# Patient Record
Sex: FEMALE | Race: WHITE | URBAN - METROPOLITAN AREA
[De-identification: names, ages, dates, MRNs, and addresses within clinical notes are randomized per-mention and may not be internally consistent; named-entity substitution may affect disease eponyms.]

---

## 2017-02-06 ENCOUNTER — TELEPHONE (OUTPATIENT)
Dept: FAMILY MEDICINE CLINIC | Facility: CLINIC | Age: 37
End: 2017-02-06

## 2017-02-06 NOTE — TELEPHONE ENCOUNTER
Patient states [employer] is to have sent the [place] for  Dr Chow to accept back as Patient. Informed Paperwork has not yet been received. Informed Patient that Appt for Friday 1:30 has been cancelled.   Vinny Zimmerman, 02/06/2017, 12:55

## 2017-02-07 ENCOUNTER — TELEPHONE (OUTPATIENT)
Dept: FAMILY MEDICINE CLINIC | Facility: CLINIC | Age: 37
End: 2017-02-07

## 2017-02-07 NOTE — TELEPHONE ENCOUNTER
----- Message from Nitch sent at 2/7/2017  2:17 PM CST -----  Contact: patient  Returned call and can be reached back on her cell [phone].  Thank you

## 2017-02-08 ENCOUNTER — TELEPHONE (OUTPATIENT)
Dept: FAMILY MEDICINE CLINIC | Facility: CLINIC | Age: 37
End: 2017-02-08

## 2017-02-08 ENCOUNTER — OFFICE VISIT (OUTPATIENT)
Dept: FAMILY MEDICINE CLINIC | Facility: CLINIC | Age: 37
End: 2017-02-08

## 2017-02-08 ENCOUNTER — LAB ENCOUNTER (OUTPATIENT)
Dept: LAB | Age: 37
End: 2017-02-08
Attending: FAMILY MEDICINE
Payer: MEDICAID

## 2017-02-08 VITALS
DIASTOLIC BLOOD PRESSURE: 84 MMHG | HEIGHT: 61.5 IN | RESPIRATION RATE: 16 BRPM | TEMPERATURE: 99 F | BODY MASS INDEX: 27.28 KG/M2 | HEART RATE: 112 BPM | WEIGHT: 146.38 LBS | SYSTOLIC BLOOD PRESSURE: 120 MMHG

## 2017-02-08 DIAGNOSIS — C50.912 BILATERAL MALIGNANT NEOPLASM OF BREAST IN FEMALE, UNSPECIFIED SITE OF BREAST: ICD-10-CM

## 2017-02-08 DIAGNOSIS — N92.0 EXCESSIVE OR FREQUENT MENSTRUATION: ICD-10-CM

## 2017-02-08 DIAGNOSIS — D68.0 VON WILLEBRAND'S DISEASE (HCC): ICD-10-CM

## 2017-02-08 DIAGNOSIS — N92.6 IRREGULAR MENSTRUAL CYCLE: ICD-10-CM

## 2017-02-08 DIAGNOSIS — M12.9 ARTHROPATHY: ICD-10-CM

## 2017-02-08 DIAGNOSIS — Z90.13 S/P BILATERAL MASTECTOMY: ICD-10-CM

## 2017-02-08 DIAGNOSIS — F41.1 ANXIETY STATE: ICD-10-CM

## 2017-02-08 DIAGNOSIS — Z01.818 PREOPERATIVE EXAMINATION: ICD-10-CM

## 2017-02-08 DIAGNOSIS — N83.209 CYST OF OVARY, UNSPECIFIED LATERALITY: ICD-10-CM

## 2017-02-08 DIAGNOSIS — C50.911 BILATERAL MALIGNANT NEOPLASM OF BREAST IN FEMALE, UNSPECIFIED SITE OF BREAST: ICD-10-CM

## 2017-02-08 DIAGNOSIS — G89.4 CHRONIC PAIN SYNDROME: ICD-10-CM

## 2017-02-08 DIAGNOSIS — Z01.818 PREOPERATIVE EXAMINATION: Primary | ICD-10-CM

## 2017-02-08 PROBLEM — Z98.818 S/P WISDOM TOOTH EXTRACTION: Status: ACTIVE | Noted: 2017-02-08

## 2017-02-08 PROBLEM — D68.00 VON WILLEBRAND'S DISEASE (HCC): Status: ACTIVE | Noted: 2017-02-08

## 2017-02-08 PROBLEM — D68.00 VON WILLEBRAND'S DISEASE: Status: ACTIVE | Noted: 2017-02-08

## 2017-02-08 PROBLEM — Z15.01 GENETIC SUSCEPTIBILITY TO BREAST CANCER: Status: ACTIVE | Noted: 2017-02-08

## 2017-02-08 LAB
ALBUMIN SERPL-MCNC: 4.1 G/DL (ref 3.5–4.8)
ALP LIVER SERPL-CCNC: 74 U/L (ref 37–98)
ALT SERPL-CCNC: 26 U/L (ref 14–54)
AST SERPL-CCNC: 15 U/L (ref 15–41)
BASOPHILS # BLD AUTO: 0.07 X10(3) UL (ref 0–0.1)
BASOPHILS NFR BLD AUTO: 0.7 %
BILIRUB SERPL-MCNC: 0.5 MG/DL (ref 0.1–2)
BUN BLD-MCNC: 10 MG/DL (ref 8–20)
CALCIUM BLD-MCNC: 8.8 MG/DL (ref 8.3–10.3)
CHLORIDE: 99 MMOL/L (ref 101–111)
CO2: 28 MMOL/L (ref 22–32)
CREAT BLD-MCNC: 0.85 MG/DL (ref 0.55–1.02)
EOSINOPHIL # BLD AUTO: 0.17 X10(3) UL (ref 0–0.3)
EOSINOPHIL NFR BLD AUTO: 1.7 %
ERYTHROCYTE [DISTWIDTH] IN BLOOD BY AUTOMATED COUNT: 13.4 % (ref 11.5–16)
GLUCOSE BLD-MCNC: 97 MG/DL (ref 70–99)
HCT VFR BLD AUTO: 39.5 % (ref 34–50)
HGB BLD-MCNC: 14.1 G/DL (ref 12–16)
IMMATURE GRANULOCYTE COUNT: 0.03 X10(3) UL (ref 0–1)
IMMATURE GRANULOCYTE RATIO %: 0.3 %
IRON SATURATION: 13 % (ref 13–45)
IRON: 72 UG/DL (ref 28–170)
LYMPHOCYTES # BLD AUTO: 3.75 X10(3) UL (ref 0.9–4)
LYMPHOCYTES NFR BLD AUTO: 38 %
M PROTEIN MFR SERPL ELPH: 8 G/DL (ref 6.1–8.3)
MCH RBC QN AUTO: 31.6 PG (ref 27–33.2)
MCHC RBC AUTO-ENTMCNC: 35.7 G/DL (ref 31–37)
MCV RBC AUTO: 88.6 FL (ref 81–100)
MONOCYTES # BLD AUTO: 0.75 X10(3) UL (ref 0.1–0.6)
MONOCYTES NFR BLD AUTO: 7.6 %
NEUTROPHIL ABS PRELIM: 5.09 X10 (3) UL (ref 1.3–6.7)
NEUTROPHILS # BLD AUTO: 5.09 X10(3) UL (ref 1.3–6.7)
NEUTROPHILS NFR BLD AUTO: 51.7 %
PLATELET # BLD AUTO: 389 10(3)UL (ref 150–450)
POTASSIUM SERPL-SCNC: 3 MMOL/L (ref 3.6–5.1)
RBC # BLD AUTO: 4.46 X10(6)UL (ref 3.8–5.1)
RED CELL DISTRIBUTION WIDTH-SD: 43.5 FL (ref 35.1–46.3)
SODIUM SERPL-SCNC: 134 MMOL/L (ref 136–144)
TOTAL IRON BINDING CAPACITY: 562 UG/DL (ref 298–536)
TRANSFERRIN: 377 MG/DL (ref 200–360)
WBC # BLD AUTO: 9.9 X10(3) UL (ref 4–13)

## 2017-02-08 PROCEDURE — 83540 ASSAY OF IRON: CPT

## 2017-02-08 PROCEDURE — 85025 COMPLETE CBC W/AUTO DIFF WBC: CPT

## 2017-02-08 PROCEDURE — 99215 OFFICE O/P EST HI 40 MIN: CPT | Performed by: FAMILY MEDICINE

## 2017-02-08 PROCEDURE — 83550 IRON BINDING TEST: CPT

## 2017-02-08 PROCEDURE — 80053 COMPREHEN METABOLIC PANEL: CPT

## 2017-02-08 RX ORDER — FERROUS SULFATE 325(65) MG
1 TABLET ORAL DAILY
COMMUNITY
Start: 2016-11-16 | End: 2017-06-27

## 2017-02-08 RX ORDER — FUROSEMIDE 20 MG/1
1 TABLET ORAL DAILY
COMMUNITY
Start: 2013-09-17 | End: 2017-03-08

## 2017-02-08 RX ORDER — PANTOPRAZOLE SODIUM 40 MG/1
1 TABLET, DELAYED RELEASE ORAL DAILY
COMMUNITY
Start: 2014-10-10 | End: 2017-06-27 | Stop reason: ALTCHOICE

## 2017-02-08 RX ORDER — CETIRIZINE HYDROCHLORIDE 10 MG/1
1 TABLET ORAL DAILY
COMMUNITY
Start: 2016-10-11

## 2017-02-08 RX ORDER — HYDROCODONE BITARTRATE AND ACETAMINOPHEN 5; 325 MG/1; MG/1
1 TABLET ORAL DAILY
COMMUNITY
Start: 2016-01-13 | End: 2017-04-19

## 2017-02-08 NOTE — TELEPHONE ENCOUNTER
Patient states She call IPA Today. They informed Her IPA Effective with KMA Tomorrow. Per Donna POSADA--Dr Cristie Cranker is PCP as of Today. Patient will keep Her Appt.   Nasrin Shah, 02/08/2017, 11:39 AM

## 2017-02-08 NOTE — PATIENT INSTRUCTIONS
Medically clear for surgery. Discuss hormone treatments for surgical menopause with Dr Rommel Castañeda.

## 2017-02-09 ENCOUNTER — TELEPHONE (OUTPATIENT)
Dept: FAMILY MEDICINE CLINIC | Facility: CLINIC | Age: 37
End: 2017-02-09

## 2017-02-09 DIAGNOSIS — E87.6 HYPOKALEMIA: Primary | ICD-10-CM

## 2017-02-09 RX ORDER — POTASSIUM CHLORIDE 750 MG/1
10 TABLET, FILM COATED, EXTENDED RELEASE ORAL DAILY
Qty: 7 TABLET | Refills: 0 | Status: SHIPPED | OUTPATIENT
Start: 2017-02-09 | End: 2017-02-10

## 2017-02-09 NOTE — H&P
[facility]  PRE-[facility]    HPI:   Paul Pavon is a 31year old female with a hx of menorrhagia and high risk HPV, who presents for a pre-operative physical exam. Patient is to have total hysterectomy and bilateral salpingo-oophorecto Use: No    Sexual Activity: Yes    Partners: Male     Other Topics Concern   None on file     Social History Narrative   None on file       REVIEW OF SYSTEMS:   CONSTITUTIONAL:  Denies unusual weight gain/loss, fever, chills, or fatigue.   EENT:  Eyes:  Andrés Kayla alert, awake and oriented, well developed, well nourished, no apparent distress.   HEENT:  Head:  Normocephalic, atraumatic Eyes: EOMI, PERRLA, no scleral icterus, conjunctivae clear bilaterally, no eye discharge Ears: TM's clear and intact bilaterally, no

## 2017-02-09 NOTE — TELEPHONE ENCOUNTER
Patient informed of K level. Patient states she has been feeling fatigued, \"coming down with something\". Has been having muscle pain but patient states she always has muscle pain due to chemotherapy. Last dose of fursomide was yesterday.    Patient is

## 2017-02-09 NOTE — TELEPHONE ENCOUNTER
Klor-con 10 mEq po daily x 7 days, recheck potassium level in 1 week. Orders placed. Follow up with Dr. Bonita Zapata one week. Plz notify pt.

## 2017-02-09 NOTE — TELEPHONE ENCOUNTER
Oneyda from [address] [place] [address] is wanting the CBC results for the pre op   Surgery is 2/16  Faxing over results Chris Kumar, 02/09/2017, 9:51 AM

## 2017-02-09 NOTE — TELEPHONE ENCOUNTER
Pt states that she is having surgery done on Thursday but is getting labs done at [facility] on Tuesday wondering if she could just get labs done on Tuesday instead? Pt verbalized her understanding to the rest of the message.      Irene can patient get labs done

## 2017-02-10 ENCOUNTER — TELEPHONE (OUTPATIENT)
Dept: FAMILY MEDICINE CLINIC | Facility: CLINIC | Age: 37
End: 2017-02-10

## 2017-02-10 RX ORDER — POTASSIUM CHLORIDE 750 MG/1
10 TABLET, FILM COATED, EXTENDED RELEASE ORAL DAILY
Qty: 30 TABLET | Refills: 5 | Status: SHIPPED | OUTPATIENT
Start: 2017-02-10 | End: 2017-06-05

## 2017-02-10 NOTE — TELEPHONE ENCOUNTER
----- Message from Karlos Nam MD sent at 2/10/2017  8:30 AM CST -----  Please call Miladys. Her potassium level is low. When she takes her Lasix she will need to take a potassium supplement with it.   I will send in a prescription for her for a pot

## 2017-02-10 NOTE — TELEPHONE ENCOUNTER
Order signed and will send back to [facility] Patient Scheduling.   Vinny Zimmerman, 02/10/2017, 4:34 PM

## 2017-03-08 ENCOUNTER — LAB ENCOUNTER (OUTPATIENT)
Dept: LAB | Age: 37
End: 2017-03-08
Attending: FAMILY MEDICINE
Payer: MEDICAID

## 2017-03-08 ENCOUNTER — OFFICE VISIT (OUTPATIENT)
Dept: FAMILY MEDICINE CLINIC | Facility: CLINIC | Age: 37
End: 2017-03-08

## 2017-03-08 ENCOUNTER — TELEPHONE (OUTPATIENT)
Dept: FAMILY MEDICINE CLINIC | Facility: CLINIC | Age: 37
End: 2017-03-08

## 2017-03-08 VITALS
HEART RATE: 120 BPM | DIASTOLIC BLOOD PRESSURE: 64 MMHG | SYSTOLIC BLOOD PRESSURE: 90 MMHG | BODY MASS INDEX: 27.81 KG/M2 | RESPIRATION RATE: 16 BRPM | HEIGHT: 62 IN | TEMPERATURE: 97 F | WEIGHT: 151.13 LBS

## 2017-03-08 DIAGNOSIS — Z90.710 STATUS POST HYSTERECTOMY: Primary | ICD-10-CM

## 2017-03-08 DIAGNOSIS — E87.6 HYPOKALEMIA: ICD-10-CM

## 2017-03-08 DIAGNOSIS — D68.0 VON WILLEBRAND'S DISEASE (HCC): ICD-10-CM

## 2017-03-08 DIAGNOSIS — Z90.710 STATUS POST HYSTERECTOMY: ICD-10-CM

## 2017-03-08 LAB
ALBUMIN SERPL-MCNC: 3.8 G/DL (ref 3.5–4.8)
ALP LIVER SERPL-CCNC: 73 U/L (ref 37–98)
ALT SERPL-CCNC: 32 U/L (ref 14–54)
AST SERPL-CCNC: 20 U/L (ref 15–41)
BASOPHILS # BLD AUTO: 0.07 X10(3) UL (ref 0–0.1)
BASOPHILS NFR BLD AUTO: 0.8 %
BILIRUB SERPL-MCNC: 0.4 MG/DL (ref 0.1–2)
BUN BLD-MCNC: 9 MG/DL (ref 8–20)
CALCIUM BLD-MCNC: 9.4 MG/DL (ref 8.3–10.3)
CHLORIDE: 104 MMOL/L (ref 101–111)
CO2: 29 MMOL/L (ref 22–32)
CREAT BLD-MCNC: 0.67 MG/DL (ref 0.55–1.02)
EOSINOPHIL # BLD AUTO: 0.21 X10(3) UL (ref 0–0.3)
EOSINOPHIL NFR BLD AUTO: 2.5 %
ERYTHROCYTE [DISTWIDTH] IN BLOOD BY AUTOMATED COUNT: 12.7 % (ref 11.5–16)
GLUCOSE BLD-MCNC: 98 MG/DL (ref 70–99)
HCT VFR BLD AUTO: 38.8 % (ref 34–50)
HGB BLD-MCNC: 13.6 G/DL (ref 12–16)
IMMATURE GRANULOCYTE COUNT: 0.02 X10(3) UL (ref 0–1)
IMMATURE GRANULOCYTE RATIO %: 0.2 %
LYMPHOCYTES # BLD AUTO: 2.78 X10(3) UL (ref 0.9–4)
LYMPHOCYTES NFR BLD AUTO: 33.7 %
M PROTEIN MFR SERPL ELPH: 7.2 G/DL (ref 6.1–8.3)
MCH RBC QN AUTO: 31.1 PG (ref 27–33.2)
MCHC RBC AUTO-ENTMCNC: 35.1 G/DL (ref 31–37)
MCV RBC AUTO: 88.6 FL (ref 81–100)
MONOCYTES # BLD AUTO: 0.77 X10(3) UL (ref 0.1–0.6)
MONOCYTES NFR BLD AUTO: 9.3 %
NEUTROPHIL ABS PRELIM: 4.39 X10 (3) UL (ref 1.3–6.7)
NEUTROPHILS # BLD AUTO: 4.39 X10(3) UL (ref 1.3–6.7)
NEUTROPHILS NFR BLD AUTO: 53.5 %
PLATELET # BLD AUTO: 324 10(3)UL (ref 150–450)
POTASSIUM SERPL-SCNC: 3.4 MMOL/L (ref 3.6–5.1)
RBC # BLD AUTO: 4.38 X10(6)UL (ref 3.8–5.1)
RED CELL DISTRIBUTION WIDTH-SD: 41.4 FL (ref 35.1–46.3)
SODIUM SERPL-SCNC: 140 MMOL/L (ref 136–144)
WBC # BLD AUTO: 8.2 X10(3) UL (ref 4–13)

## 2017-03-08 PROCEDURE — 85025 COMPLETE CBC W/AUTO DIFF WBC: CPT

## 2017-03-08 PROCEDURE — 80053 COMPREHEN METABOLIC PANEL: CPT

## 2017-03-08 PROCEDURE — 99214 OFFICE O/P EST MOD 30 MIN: CPT | Performed by: FAMILY MEDICINE

## 2017-03-08 RX ORDER — IBUPROFEN 600 MG/1
1 TABLET ORAL AS DIRECTED
Refills: 1 | COMMUNITY
Start: 2017-03-01 | End: 2017-04-19

## 2017-03-08 RX ORDER — FUROSEMIDE 20 MG/1
20 TABLET ORAL DAILY
Qty: 30 TABLET | Refills: 5 | Status: SHIPPED | OUTPATIENT
Start: 2017-03-08 | End: 2017-04-19

## 2017-03-08 RX ORDER — HYDROCODONE BITARTRATE AND ACETAMINOPHEN 7.5; 325 MG/1; MG/1
1 TABLET ORAL AS DIRECTED
Refills: 0 | COMMUNITY
Start: 2017-03-01 | End: 2017-04-19

## 2017-03-08 NOTE — PROGRESS NOTES
[facility] [place]  PROGRESS NOTE  Chief Complaint:   Patient presents with:  Post-Op: ROSA ELENA      HPI:   This is a 31year old female coming in for follow-up after a hysterectomy.   She had a laparoscopic vaginal hysterectomy at [facility] 38.0 %   Monocyte % 7.6 %   Eosinophil % 1.7 %   Basophil % 0.7 %   Immature Granulocyte % 0.3 %       Past Medical History   Diagnosis Date   • Allergic rhinitis    • Cancer [facility])    • Esophageal reflux    • [facility] 2/8/2017   • Mal Rfl: 5   Potassium Chloride ER (KLOR-CON 10) 10 MEQ Oral Tab CR Take 1 tablet (10 mEq total) by mouth daily. Disp: 30 tablet Rfl: 5   cetirizine (ZYRTEC ALLERGY) 10 MG Oral Tab Take 1 tablet by mouth daily.  Disp:  Rfl:    Ferrous Sulfate 325 (65 Fe) MG Ora 12/07/2016 (Approximate) Estimated body mass index is 27.63 kg/(m^2) as calculated from the following:    Height as of this encounter: 62\". Weight as of this encounter: 151 lb 2 oz. Vital signs reviewed.   GEN:  Patient is alert, awake and oriented, w by mouth daily. Patient/Caregiver Education: Patient/Caregiver Education: There are no barriers to learning. Medical education done. Outcome: Patient verbalizes understanding.  Patient is notified to call with any questions, complications, all

## 2017-03-09 NOTE — TELEPHONE ENCOUNTER
----- Message from Nury Simental MD sent at 3/8/2017  5:32 PM CST -----  Please call Miladys. Her potassium level is still low. I do want her to take the potassium that we have prescribed for her before.   Her blood count is normal.  She is not anemic

## 2017-03-10 NOTE — TELEPHONE ENCOUNTER
Patient informed of Dr Blanca Perez documentation below. Expressed understanding.   Elijah Green, 03/10/2017, 1:10 PM

## 2017-03-10 NOTE — TELEPHONE ENCOUNTER
----- Message from Lupe Brand sent at 3/10/2017 10:00 AM CST -----  Contact: patient   Returned your call from Wednesday and can be reached back at number above.  Thank you

## 2017-04-03 ENCOUNTER — TELEPHONE (OUTPATIENT)
Dept: FAMILY MEDICINE CLINIC | Facility: CLINIC | Age: 37
End: 2017-04-03

## 2017-04-03 RX ORDER — NITROFURANTOIN 25; 75 MG/1; MG/1
100 CAPSULE ORAL DAILY
Refills: 0 | COMMUNITY
Start: 2017-03-27 | End: 2017-04-19 | Stop reason: ALTCHOICE

## 2017-04-03 NOTE — TELEPHONE ENCOUNTER
AGUILARI---Patient states She was started on Progesterone 100mg. Allergic to Rx. Hives/Itching. States will be starting low dose BCP for Menopausal Sx. Wanting Dr Thomas Plant aware.   Kristal Berry, 04/03/2017, 9:23 AM

## 2017-04-19 ENCOUNTER — OFFICE VISIT (OUTPATIENT)
Dept: FAMILY MEDICINE CLINIC | Facility: CLINIC | Age: 37
End: 2017-04-19

## 2017-04-19 ENCOUNTER — TELEPHONE (OUTPATIENT)
Dept: FAMILY MEDICINE CLINIC | Facility: CLINIC | Age: 37
End: 2017-04-19

## 2017-04-19 ENCOUNTER — APPOINTMENT (OUTPATIENT)
Dept: LAB | Age: 37
End: 2017-04-19
Attending: NURSE PRACTITIONER
Payer: MEDICAID

## 2017-04-19 VITALS
TEMPERATURE: 98 F | BODY MASS INDEX: 28 KG/M2 | DIASTOLIC BLOOD PRESSURE: 70 MMHG | WEIGHT: 155.63 LBS | SYSTOLIC BLOOD PRESSURE: 130 MMHG | HEART RATE: 82 BPM

## 2017-04-19 DIAGNOSIS — R60.1 GENERALIZED EDEMA: ICD-10-CM

## 2017-04-19 DIAGNOSIS — R60.1 GENERALIZED EDEMA: Primary | ICD-10-CM

## 2017-04-19 PROCEDURE — 99214 OFFICE O/P EST MOD 30 MIN: CPT | Performed by: NURSE PRACTITIONER

## 2017-04-19 PROCEDURE — 80053 COMPREHEN METABOLIC PANEL: CPT

## 2017-04-19 RX ORDER — FUROSEMIDE 20 MG/1
TABLET ORAL
Qty: 60 TABLET | Refills: 3 | Status: SHIPPED | OUTPATIENT
Start: 2017-04-19 | End: 2017-06-27 | Stop reason: DRUGHIGH

## 2017-04-19 RX ORDER — HYDROCODONE BITARTRATE AND ACETAMINOPHEN 5; 325 MG/1; MG/1
1 TABLET ORAL DAILY
Qty: 30 TABLET | Refills: 0 | Status: SHIPPED | OUTPATIENT
Start: 2017-04-19 | End: 2017-07-17

## 2017-04-19 RX ORDER — IBUPROFEN 600 MG/1
600 TABLET ORAL EVERY 8 HOURS PRN
Qty: 60 TABLET | Refills: 3 | Status: SHIPPED | OUTPATIENT
Start: 2017-04-19 | End: 2017-10-08

## 2017-04-19 RX ORDER — NORETHINDRONE ACETATE AND ETHINYL ESTRADIOL AND FERROUS FUMARATE 1MG-20(21)
KIT ORAL
Refills: 0 | COMMUNITY
Start: 2017-04-03 | End: 2017-06-27 | Stop reason: ALTCHOICE

## 2017-04-19 NOTE — PROGRESS NOTES
Reji Torres is a 31year old female. Patient presents with:  Swelling      HPI:   Had ROSA ELENA BSO- lap - vaginal Feb, 2017. Having swelling to both arms-  Has had more swelling to hands - and some swelling to legs   lasix 20 mg. - not helping.     some nu Pantoprazole Sodium (PROTONIX) 40 MG Oral Tab EC Take 1 tablet by mouth daily. Disp:  Rfl:    HYDROcodone-acetaminophen (NORCO) 5-325 MG Oral Tab Take 1 tablet by mouth daily.  Disp: 30 tablet Rfl: 0   [DISCONTINUED] hydrocodone-acetaminophen 7.5-325 MG O HPI      EXAM:   /70 mmHg  Pulse 82  Temp(Src) 98.2 °F (36.8 °C) (Tympanic)  Wt 155 lb 9.6 oz  LMP 12/07/2016 (Approximate)  GENERAL: well developed, well nourished,in no apparent distress  SKIN: no rashes,no suspicious lesions  HEENT: atraumatic, no

## 2017-04-19 NOTE — TELEPHONE ENCOUNTER
Patient states She is having increased hand/feet swelling. Would like evaluation before She leaves out of town. Appt given with Marcos VASQUEZ 2:15 Today.   Juaquin Calloway, 04/19/2017, 10:53 AM

## 2017-04-19 NOTE — PATIENT INSTRUCTIONS
Take 2, 20 mg Lasix tablets once a day for the next 3 days, then you may decrease to 1 pill once a day. We will check blood work today to check potassium and sodium levels, results are back tomorrow we will call you.

## 2017-04-20 ENCOUNTER — TELEPHONE (OUTPATIENT)
Dept: FAMILY MEDICINE CLINIC | Facility: CLINIC | Age: 37
End: 2017-04-20

## 2017-04-20 RX ORDER — IBUPROFEN 600 MG/1
TABLET ORAL
Qty: 270 TABLET | Refills: 3 | OUTPATIENT
Start: 2017-04-20

## 2017-05-08 ENCOUNTER — OFFICE VISIT (OUTPATIENT)
Dept: FAMILY MEDICINE CLINIC | Facility: CLINIC | Age: 37
End: 2017-05-08

## 2017-05-08 VITALS
DIASTOLIC BLOOD PRESSURE: 64 MMHG | RESPIRATION RATE: 16 BRPM | BODY MASS INDEX: 29.04 KG/M2 | HEIGHT: 61.25 IN | OXYGEN SATURATION: 99 % | TEMPERATURE: 98 F | HEART RATE: 114 BPM | WEIGHT: 155.81 LBS | SYSTOLIC BLOOD PRESSURE: 124 MMHG

## 2017-05-08 DIAGNOSIS — Z15.02 GENETIC SUSCEPTIBILITY TO OVARIAN CANCER: ICD-10-CM

## 2017-05-08 DIAGNOSIS — R60.1 GENERALIZED EDEMA: ICD-10-CM

## 2017-05-08 DIAGNOSIS — Z90.710 STATUS POST HYSTERECTOMY: ICD-10-CM

## 2017-05-08 DIAGNOSIS — Z90.13 S/P BILATERAL MASTECTOMY: ICD-10-CM

## 2017-05-08 DIAGNOSIS — F41.1 ANXIETY STATE: ICD-10-CM

## 2017-05-08 DIAGNOSIS — G89.4 CHRONIC PAIN SYNDROME: ICD-10-CM

## 2017-05-08 DIAGNOSIS — M12.9 ARTHROPATHY: Primary | ICD-10-CM

## 2017-05-08 DIAGNOSIS — E87.6 HYPOKALEMIA: ICD-10-CM

## 2017-05-08 DIAGNOSIS — D68.0 VON WILLEBRAND'S DISEASE (HCC): ICD-10-CM

## 2017-05-08 DIAGNOSIS — Z15.01 GENETIC SUSCEPTIBILITY TO BREAST CANCER: ICD-10-CM

## 2017-05-08 PROCEDURE — 99214 OFFICE O/P EST MOD 30 MIN: CPT | Performed by: FAMILY MEDICINE

## 2017-05-08 RX ORDER — HYDROCODONE BITARTRATE AND ACETAMINOPHEN 7.5; 325 MG/1; MG/1
1 TABLET ORAL DAILY PRN
Qty: 30 TABLET | Refills: 0 | Status: SHIPPED | OUTPATIENT
Start: 2017-05-08 | End: 2017-06-05

## 2017-05-09 NOTE — PROGRESS NOTES
[facility] [place]  PROGRESS NOTE  Chief Complaint:   Patient presents with:  Medication Follow-Up      HPI:   This is a 31year old female coming in for follow-up. She had her hysterectomy in February.   Since that time she has been following PANEL (14)   Result Value Ref Range   Glucose 86 70-99 mg/dL   BUN 8 8-20 mg/dL   Creatinine 0.78 0.55-1.02 mg/dL   GFR 99 >=60   Calcium, Total 9.1 8.3-10.3 mg/dL   Alkaline Phosphatase 74 37-98 U/L   AST 14 (L) 15-41 U/L   Alt 25 14-54 U/L   Bilirubin, T Palpitations  Current Meds:    Current Outpatient Prescriptions:  hydrocodone-acetaminophen (NORCO) 7.5-325 MG Oral Tab Take 1 tablet by mouth daily as needed for Pain.  Disp: 30 tablet Rfl: 0   MICROGESTIN FE 1/20 1-20 MG-MCG Oral Tab TK 1 T PO QD pain, swelling or stiffness. NEUROLOGICAL:  Denies headache, seizures, dizziness, syncope, paralysis, ataxia, numbness or tingling in the extremities,change in bowel or bladder control. HEMATOLOGIC: She has not had any recent bleeding issues.   LYMPHATICS favors her right hip. Her legs are somewhat stiff when she walks. ASSESSMENT AND PLAN:   1. Arthropathy  She has diffuse arthropathy which is a major part of her chronic pain syndrome. This arthropathy has not improved recently.     2. Ronaldo Welch allergies, or worsening or changing symptoms. Patient is to call with any side effects or complications from the treatments as a result of today.      Problem List:  Patient Active Problem List:     Anxiety state     Arthropathy     Vaginal high risk human

## 2017-06-05 ENCOUNTER — OFFICE VISIT (OUTPATIENT)
Dept: FAMILY MEDICINE CLINIC | Facility: CLINIC | Age: 37
End: 2017-06-05

## 2017-06-05 ENCOUNTER — LAB ENCOUNTER (OUTPATIENT)
Dept: LAB | Age: 37
End: 2017-06-05
Attending: FAMILY MEDICINE
Payer: MEDICAID

## 2017-06-05 VITALS
SYSTOLIC BLOOD PRESSURE: 104 MMHG | RESPIRATION RATE: 18 BRPM | HEIGHT: 61.5 IN | BODY MASS INDEX: 28.59 KG/M2 | DIASTOLIC BLOOD PRESSURE: 70 MMHG | TEMPERATURE: 98 F | HEART RATE: 114 BPM | WEIGHT: 153.38 LBS

## 2017-06-05 DIAGNOSIS — D68.0 VON WILLEBRAND'S DISEASE (HCC): ICD-10-CM

## 2017-06-05 DIAGNOSIS — M12.9 ARTHROPATHY: ICD-10-CM

## 2017-06-05 DIAGNOSIS — E87.6 HYPOKALEMIA: ICD-10-CM

## 2017-06-05 DIAGNOSIS — R60.1 GENERALIZED EDEMA: ICD-10-CM

## 2017-06-05 DIAGNOSIS — E87.6 HYPOKALEMIA: Primary | ICD-10-CM

## 2017-06-05 DIAGNOSIS — N95.1 MENOPAUSAL SYNDROME: ICD-10-CM

## 2017-06-05 DIAGNOSIS — G89.4 CHRONIC PAIN SYNDROME: ICD-10-CM

## 2017-06-05 PROCEDURE — 85025 COMPLETE CBC W/AUTO DIFF WBC: CPT

## 2017-06-05 PROCEDURE — 80053 COMPREHEN METABOLIC PANEL: CPT

## 2017-06-05 PROCEDURE — 36415 COLL VENOUS BLD VENIPUNCTURE: CPT

## 2017-06-05 PROCEDURE — 99213 OFFICE O/P EST LOW 20 MIN: CPT | Performed by: FAMILY MEDICINE

## 2017-06-05 RX ORDER — HYDROCODONE BITARTRATE AND ACETAMINOPHEN 7.5; 325 MG/1; MG/1
1 TABLET ORAL DAILY PRN
Qty: 30 TABLET | Refills: 0 | Status: SHIPPED | OUTPATIENT
Start: 2017-06-05 | End: 2017-06-20

## 2017-06-05 RX ORDER — POTASSIUM CHLORIDE 750 MG/1
10 TABLET, FILM COATED, EXTENDED RELEASE ORAL 2 TIMES DAILY
Qty: 180 TABLET | Refills: 3 | Status: SHIPPED | OUTPATIENT
Start: 2017-06-05 | End: 2018-04-09

## 2017-06-05 RX ORDER — FUROSEMIDE 40 MG/1
40 TABLET ORAL DAILY
Qty: 90 TABLET | Refills: 3 | Status: SHIPPED | OUTPATIENT
Start: 2017-06-05 | End: 2018-02-25

## 2017-06-05 RX ORDER — ESTRADIOL 0.5 MG/1
0.5 TABLET ORAL DAILY
COMMUNITY
Start: 2017-06-03 | End: 2017-09-26

## 2017-06-05 NOTE — PROGRESS NOTES
[address]  PROGRESS NOTE  Chief Complaint:   Patient presents with: Follow - Up: med check      HPI:   This is a 31year old female coming in for follow-up on her medications. She said she is gradually feeling better.   She still has s HYSTERECTOMY  Feb , 2016    Comment ROSA ELENA, BSO, lap/vaginal -      Social History:    Smoking Status: Former Smoker                   Packs/Day: 0.25  Years:           Types: Cigarettes      Start date: 01/01/1999      Quit date: 01/01/2009    Smokeless Stat (ZYRTEC ALLERGY) 10 MG Oral Tab Take 1 tablet by mouth daily. Disp:  Rfl:    Ferrous Sulfate 325 (65 Fe) MG Oral Tab Take 1 tablet by mouth daily. Disp:  Rfl:    Pantoprazole Sodium (PROTONIX) 40 MG Oral Tab EC Take 1 tablet by mouth daily.  Disp:  Rfl: rhinitis.      EXAM:   /70 mmHg  Pulse 114  Temp(Src) 97.9 °F (36.6 °C) (Temporal)  Resp 18  Ht 61.5\"  Wt 153 lb 6.4 oz  BMI 28.52 kg/m2  LMP 12/07/2016 (Approximate) Estimated body mass index is 28.52 kg/(m^2) as calculated from the following:    He syndrome. We will continue the current regimen of Norco 7.5 mg daily. 6. Generalized edema  Generalized edema seems fairly well-controlled on the current dose of furosemide.   Plan: She may take 40 mg, 20 mg, or 0 furosemide daily depending on the degre S/P wisdom tooth extraction     Hypokalemia     Status post hysterectomy     Female infertility     Primary malignant neoplasm of breast ([facility]     Type 1 von Willebrand disease ([facility]     Menopausal syndrome      Vee Scott MD  6/5/2017  3:50 PM

## 2017-06-06 ENCOUNTER — TELEPHONE (OUTPATIENT)
Dept: FAMILY MEDICINE CLINIC | Facility: CLINIC | Age: 37
End: 2017-06-06

## 2017-06-06 NOTE — TELEPHONE ENCOUNTER
----- Message from Kat Solares MD sent at 6/6/2017  7:53 AM CDT -----  Please call Miladys. Her potassium is still on the low side at 3.5. Make sure to take the potassium as directed.   The rest of the labs are normal.

## 2017-06-20 ENCOUNTER — TELEPHONE (OUTPATIENT)
Dept: FAMILY MEDICINE CLINIC | Facility: CLINIC | Age: 37
End: 2017-06-20

## 2017-06-20 RX ORDER — HYDROCODONE BITARTRATE AND ACETAMINOPHEN 7.5; 325 MG/1; MG/1
1 TABLET ORAL DAILY PRN
Qty: 30 TABLET | Refills: 0 | Status: SHIPPED | OUTPATIENT
Start: 2017-06-20 | End: 2017-06-27

## 2017-06-20 NOTE — TELEPHONE ENCOUNTER
Please advise another Rx of Norco.  Please advise if Ok to take Naproxyn with other Medications.   Indra Terrazas, 06/20/2017, 1:57 PM

## 2017-06-20 NOTE — TELEPHONE ENCOUNTER
I will refill [place].  Naproxen is OK with other medicines as long as she stops taking Ibuprofen.

## 2017-06-26 ENCOUNTER — TELEPHONE (OUTPATIENT)
Dept: FAMILY MEDICINE CLINIC | Facility: CLINIC | Age: 37
End: 2017-06-26

## 2017-06-26 NOTE — TELEPHONE ENCOUNTER
Patient with recent [facility] ER evaluation. States She is still having a lot of chest discomfort. Also states She now has some kind of pocket near Her Ribs. Requesting to see Dr Chandler Galvan only-Offered evaluation with other provider-denied.   Appt given 11:30

## 2017-06-27 ENCOUNTER — OFFICE VISIT (OUTPATIENT)
Dept: FAMILY MEDICINE CLINIC | Facility: CLINIC | Age: 37
End: 2017-06-27

## 2017-06-27 VITALS
BODY MASS INDEX: 29.12 KG/M2 | TEMPERATURE: 98 F | HEART RATE: 100 BPM | WEIGHT: 156.25 LBS | DIASTOLIC BLOOD PRESSURE: 60 MMHG | OXYGEN SATURATION: 96 % | RESPIRATION RATE: 16 BRPM | HEIGHT: 61.5 IN | SYSTOLIC BLOOD PRESSURE: 106 MMHG

## 2017-06-27 DIAGNOSIS — L03.313 CELLULITIS OF CHEST WALL: Primary | ICD-10-CM

## 2017-06-27 DIAGNOSIS — G89.4 CHRONIC PAIN SYNDROME: ICD-10-CM

## 2017-06-27 DIAGNOSIS — Z90.13 S/P BILATERAL MASTECTOMY: ICD-10-CM

## 2017-06-27 PROCEDURE — 99213 OFFICE O/P EST LOW 20 MIN: CPT | Performed by: FAMILY MEDICINE

## 2017-06-27 RX ORDER — HYDROCODONE BITARTRATE AND ACETAMINOPHEN 10; 325 MG/1; MG/1
1 TABLET ORAL EVERY 6 HOURS PRN
Qty: 20 TABLET | Refills: 0 | Status: SHIPPED | OUTPATIENT
Start: 2017-06-27 | End: 2017-08-21 | Stop reason: DRUGHIGH

## 2017-06-27 RX ORDER — FERROUS SULFATE 325(65) MG
1 TABLET ORAL DAILY
Qty: 90 TABLET | Refills: 3 | Status: SHIPPED | OUTPATIENT
Start: 2017-06-27 | End: 2018-02-07

## 2017-06-27 RX ORDER — CEPHALEXIN 500 MG/1
500 CAPSULE ORAL 3 TIMES DAILY
Qty: 21 CAPSULE | Refills: 0 | Status: SHIPPED | OUTPATIENT
Start: 2017-06-27 | End: 2017-08-21 | Stop reason: ALTCHOICE

## 2017-06-27 RX ORDER — ESOMEPRAZOLE MAGNESIUM 40 MG/1
40 CAPSULE, DELAYED RELEASE ORAL
COMMUNITY
End: 2018-11-30

## 2017-06-28 NOTE — PROGRESS NOTES
[facility] [place]  PROGRESS NOTE  Chief Complaint:   Patient presents with:  Hospital F/U: Post [facility] ER  Swelling: Left Rib Cage/Flank      HPI:   This is a 31year old female coming in for follow-up from an emergency room visit.   She said that Eosinophil Absolute 0.16 0.00 - 0.30 x10(3) uL   Basophil Absolute 0.06 0.00 - 0.10 x10(3) uL   Immature Granulocyte Absolute 0.02 0.00 - 1.00 x10(3) uL   Neutrophil % 60.6 %   Lymphocyte % 27.7 %   Monocyte % 9.2 %   Eosinophil % 1.7 %   Basophil % 0.6 Capsule Delayed Release Take 40 mg by mouth every morning before breakfast. Disp:  Rfl:    cephALEXin 500 MG Oral Cap Take 1 capsule (500 mg total) by mouth 3 (three) times daily.  Disp: 21 capsule Rfl: 0   HYDROcodone-acetaminophen (NORCO)  MG Oral T wheezing, cough or sputum. GASTROINTESTINAL:  Denies abdominal pain, nausea, vomiting, constipation, diarrhea, or blood in stool. MUSCULOSKELETAL:  Denies weakness, muscle aches, back pain, joint pain, swelling or stiffness.   NEUROLOGICAL:  Denies headac with it. It follows roughly along the seventh rib. Breast: Her breast implants are intact bilaterally. There is no pain or tenderness associated with either implant. There is no evidence of deformity or malformation of the implants.   ABDOMEN:  Soft, no understanding. Patient is notified to call with any questions, complications, allergies, or worsening or changing symptoms. Patient is to call with any side effects or complications from the treatments as a result of today.      Problem List:  Patient Acti

## 2017-07-05 ENCOUNTER — TELEPHONE (OUTPATIENT)
Dept: FAMILY MEDICINE CLINIC | Facility: CLINIC | Age: 37
End: 2017-07-05

## 2017-07-05 DIAGNOSIS — Q67.8 CHEST WALL ASYMMETRY: Primary | ICD-10-CM

## 2017-07-05 NOTE — TELEPHONE ENCOUNTER
Next step is a CT of the chest and a followup appointment. I will send in an order for the CT. I will be out of the office the next 2 weeks so I advise a follow-up with one of the other physicians in the office.

## 2017-07-06 ENCOUNTER — TELEPHONE (OUTPATIENT)
Dept: FAMILY MEDICINE CLINIC | Facility: CLINIC | Age: 37
End: 2017-07-06

## 2017-07-06 DIAGNOSIS — C50.919 PRIMARY MALIGNANT NEOPLASM OF BREAST, UNSPECIFIED LATERALITY (HCC): ICD-10-CM

## 2017-07-06 DIAGNOSIS — L03.313 CELLULITIS OF CHEST WALL: Primary | ICD-10-CM

## 2017-07-06 NOTE — TELEPHONE ENCOUNTER
[facility] Patient Scheduling is calling asking for a new order. Dr Beni Zamudio ordered a CT of Chest of W/ & W/O, but can not be done with both. Need new order for W/ or W/O. Camilla Harley can you please advise. Thank you.

## 2017-07-07 ENCOUNTER — TELEPHONE (OUTPATIENT)
Dept: FAMILY MEDICINE CLINIC | Facility: CLINIC | Age: 37
End: 2017-07-07

## 2017-07-07 NOTE — TELEPHONE ENCOUNTER
Patient informed of below. She will call for CT Scan Appt and follow up visit with provider. Will call back when She has a CT Scan date.   Cristobal Alatorre, 07/07/17, 1:21 PM

## 2017-07-10 NOTE — TELEPHONE ENCOUNTER
Patient has CT Scan Scheduled for Thursday 7/13. Appt given to review results with Dr Lane Lou Monday 7/17.   Tegan Mendez, 07/10/17, 5:34 PM

## 2017-07-14 ENCOUNTER — TELEPHONE (OUTPATIENT)
Dept: FAMILY MEDICINE CLINIC | Facility: CLINIC | Age: 37
End: 2017-07-14

## 2017-07-14 NOTE — TELEPHONE ENCOUNTER
Patient's CT Scan Report was also placed on Dr James Leiva. Per Dr Sofie Landau Patient no cancer noted on CT Scan/Done. Patient advised to keep Appt with Dr Camilo Zarco on Monday to review futher  CT Results and recommendations/agreed.   Althia Counter

## 2017-07-17 ENCOUNTER — TELEPHONE (OUTPATIENT)
Dept: FAMILY MEDICINE CLINIC | Facility: CLINIC | Age: 37
End: 2017-07-17

## 2017-07-17 ENCOUNTER — OFFICE VISIT (OUTPATIENT)
Dept: FAMILY MEDICINE CLINIC | Facility: CLINIC | Age: 37
End: 2017-07-17

## 2017-07-17 VITALS
SYSTOLIC BLOOD PRESSURE: 108 MMHG | BODY MASS INDEX: 28 KG/M2 | DIASTOLIC BLOOD PRESSURE: 72 MMHG | RESPIRATION RATE: 18 BRPM | TEMPERATURE: 97 F | HEART RATE: 106 BPM | WEIGHT: 152.38 LBS

## 2017-07-17 DIAGNOSIS — R07.9 CHEST PAIN, UNSPECIFIED TYPE: Primary | ICD-10-CM

## 2017-07-17 PROCEDURE — 99213 OFFICE O/P EST LOW 20 MIN: CPT | Performed by: FAMILY MEDICINE

## 2017-07-17 RX ORDER — CLINDAMYCIN HYDROCHLORIDE 300 MG/1
1 CAPSULE ORAL DAILY
Refills: 0 | COMMUNITY
Start: 2017-07-14 | End: 2017-09-26 | Stop reason: ALTCHOICE

## 2017-07-17 RX ORDER — HYDROCODONE BITARTRATE AND ACETAMINOPHEN 10; 325 MG/1; MG/1
1 TABLET ORAL EVERY 6 HOURS PRN
Qty: 20 TABLET | Refills: 0 | Status: CANCELLED | OUTPATIENT
Start: 2017-07-17

## 2017-07-17 RX ORDER — NORETHINDRONE ACETATE AND ETHINYL ESTRADIOL 1MG-20(24)
1 KIT ORAL DAILY
Refills: 0 | COMMUNITY
Start: 2017-07-07 | End: 2018-01-10 | Stop reason: ALTCHOICE

## 2017-07-17 RX ORDER — HYDROCODONE BITARTRATE AND ACETAMINOPHEN 5; 325 MG/1; MG/1
1 TABLET ORAL DAILY
Qty: 15 TABLET | Refills: 0 | Status: SHIPPED | OUTPATIENT
Start: 2017-07-17 | End: 2017-08-07

## 2017-07-17 NOTE — TELEPHONE ENCOUNTER
As below. Patient requesting refill of [place] 5/25 1 daily for pain. Will continue with Ibuprofen every 6hrs/prn for pain and swelling also. Please advise. Blanca Bond, 07/17/17, 4:49 PM      Phone call back from Patient.   Unable to see Plastic Surge

## 2017-07-17 NOTE — PATIENT INSTRUCTIONS
To see plastic surgeon in [facility] for evaluation. Recheck here if no improvement.

## 2017-07-17 NOTE — PROGRESS NOTES
[address]  PROGRESS NOTE  Chief Complaint:   Patient presents with: Other: Go over CT scan results      HPI:   This is a 31year old female coming in for continued left lateral chest pain which she has had for the past 1 month.   David Neutrophil Absolute 5.65 1.30 - 6.70 x10(3) uL   Lymphocyte Absolute 2.58 0.90 - 4.00 x10(3) uL   Monocyte Absolute 0.86 (H) 0.10 - 0.60 x10(3) uL   Eosinophil Absolute 0.16 0.00 - 0.30 x10(3) uL   Basophil Absolute 0.06 0.00 - 0.10 x10(3) uL   Immature Comment:Hives/Itching  Pseudoephedrine         Palpitations  Current Meds:    Current Outpatient Prescriptions:  Clindamycin HCl 300 MG Oral Cap Take 1 capsule by mouth daily.  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

## 2017-07-17 NOTE — TELEPHONE ENCOUNTER
Miladys called and wants to stick with 7.5. She does not want to be on the 10. If any questions, please call her back.

## 2017-08-07 RX ORDER — HYDROCODONE BITARTRATE AND ACETAMINOPHEN 5; 325 MG/1; MG/1
1 TABLET ORAL DAILY
Qty: 30 TABLET | Refills: 0 | Status: SHIPPED | OUTPATIENT
Start: 2017-08-07 | End: 2017-09-13

## 2017-08-18 ENCOUNTER — TELEPHONE (OUTPATIENT)
Dept: FAMILY MEDICINE CLINIC | Facility: CLINIC | Age: 37
End: 2017-08-18

## 2017-08-18 NOTE — TELEPHONE ENCOUNTER
Just wanted to let you know surgery was moved up. Surgeon said does not need pre op px. still going to keep appt for a follow up with Dr. Vinny Sewell.  Don't need to call back

## 2017-08-21 ENCOUNTER — OFFICE VISIT (OUTPATIENT)
Dept: FAMILY MEDICINE CLINIC | Facility: CLINIC | Age: 37
End: 2017-08-21

## 2017-08-21 ENCOUNTER — TELEPHONE (OUTPATIENT)
Dept: FAMILY MEDICINE CLINIC | Facility: CLINIC | Age: 37
End: 2017-08-21

## 2017-08-21 VITALS
SYSTOLIC BLOOD PRESSURE: 114 MMHG | BODY MASS INDEX: 27 KG/M2 | TEMPERATURE: 98 F | DIASTOLIC BLOOD PRESSURE: 86 MMHG | HEART RATE: 64 BPM | RESPIRATION RATE: 20 BRPM | WEIGHT: 146 LBS

## 2017-08-21 DIAGNOSIS — K52.9 GASTROENTERITIS: Primary | ICD-10-CM

## 2017-08-21 PROCEDURE — 99213 OFFICE O/P EST LOW 20 MIN: CPT | Performed by: FAMILY MEDICINE

## 2017-08-21 NOTE — PROGRESS NOTES
[facility] [place]  PROGRESS NOTE  Chief Complaint:   Patient presents with:  Diarrhea: since 8/20  Vomiting: since 8/20  Nausea: since 8/20      HPI:   This is a 31year old female resents complaining of diarrhea and vomiting that started yeste Granulocyte % 0.2 %       Past Medical History:   Diagnosis Date   • Allergic rhinitis    • Cancer 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 Disp:  Rfl:    Ferrous Sulfate 325 (65 Fe) MG Oral Tab Take 1 tablet (325 mg total) by mouth daily.  Disp: 90 tablet Rfl: 3   estradiol 0 (Tympanic)   Resp 20   Wt 146 lb   LMP 12/07/2016 (Approximate)   BMI 27.14 kg/m²  Estimated body mass index is 27.14 kg/m² as calculated from the following:    Height as of 6/27/17: 61.5\". Weight as of this encounter: 146 lb. Vital signs reviewed. 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

## 2017-08-21 NOTE — PATIENT INSTRUCTIONS
Likely viral infection. Recommend plenty of fluids with gatorade. BRAT diet. (bread, rice, apple, toast)  Go to ER if abdominal pain, fever, chills  or dehydration  Return to clinic in 2-3 days if no improvement. Sooner if symptoms gets worse.

## 2017-08-21 NOTE — TELEPHONE ENCOUNTER
Patient states She has been having Nausea/Vomiting/Diarrhea. Unable to keep food down. Appt given today 1:30 with Dr Esther Cardoza for evaluation of Sx.   Shan Ortiz, 08/21/17, 10:53 AM

## 2017-09-06 ENCOUNTER — MED REC SCAN ONLY (OUTPATIENT)
Dept: FAMILY MEDICINE CLINIC | Facility: CLINIC | Age: 37
End: 2017-09-06

## 2017-09-13 ENCOUNTER — TELEPHONE (OUTPATIENT)
Dept: FAMILY MEDICINE CLINIC | Facility: CLINIC | Age: 37
End: 2017-09-13

## 2017-09-13 RX ORDER — HYDROCODONE BITARTRATE AND ACETAMINOPHEN 5; 325 MG/1; MG/1
1 TABLET ORAL DAILY
Qty: 30 TABLET | Refills: 0 | Status: SHIPPED | OUTPATIENT
Start: 2017-09-13 | End: 2017-10-08

## 2017-09-26 ENCOUNTER — OFFICE VISIT (OUTPATIENT)
Dept: FAMILY MEDICINE CLINIC | Facility: CLINIC | Age: 37
End: 2017-09-26

## 2017-09-26 VITALS
SYSTOLIC BLOOD PRESSURE: 110 MMHG | BODY MASS INDEX: 28.14 KG/M2 | DIASTOLIC BLOOD PRESSURE: 74 MMHG | HEART RATE: 102 BPM | WEIGHT: 151 LBS | HEIGHT: 61.5 IN | TEMPERATURE: 98 F | RESPIRATION RATE: 16 BRPM

## 2017-09-26 DIAGNOSIS — C50.912 BILATERAL MALIGNANT NEOPLASM OF BREAST IN FEMALE, UNSPECIFIED ESTROGEN RECEPTOR STATUS, UNSPECIFIED SITE OF BREAST (HCC): ICD-10-CM

## 2017-09-26 DIAGNOSIS — C50.911 BILATERAL MALIGNANT NEOPLASM OF BREAST IN FEMALE, UNSPECIFIED ESTROGEN RECEPTOR STATUS, UNSPECIFIED SITE OF BREAST (HCC): ICD-10-CM

## 2017-09-26 DIAGNOSIS — N95.1 MENOPAUSAL SYNDROME: ICD-10-CM

## 2017-09-26 DIAGNOSIS — G89.4 CHRONIC PAIN SYNDROME: ICD-10-CM

## 2017-09-26 DIAGNOSIS — M12.9 ARTHROPATHY: Primary | ICD-10-CM

## 2017-09-26 DIAGNOSIS — N97.9 FEMALE INFERTILITY: ICD-10-CM

## 2017-09-26 PROCEDURE — 90686 IIV4 VACC NO PRSV 0.5 ML IM: CPT | Performed by: FAMILY MEDICINE

## 2017-09-26 PROCEDURE — 99214 OFFICE O/P EST MOD 30 MIN: CPT | Performed by: FAMILY MEDICINE

## 2017-09-26 PROCEDURE — 90471 IMMUNIZATION ADMIN: CPT | Performed by: FAMILY MEDICINE

## 2017-09-26 RX ORDER — HYDROCODONE BITARTRATE AND ACETAMINOPHEN 7.5; 325 MG/1; MG/1
1 TABLET ORAL EVERY 4 HOURS PRN
Qty: 30 TABLET | Refills: 0 | Status: SHIPPED | OUTPATIENT
Start: 2017-09-26 | End: 2017-10-26

## 2017-09-26 NOTE — PROGRESS NOTES
[facility] [place]  PROGRESS NOTE  Chief Complaint:   Patient presents with:  Medication Follow-Up      HPI:   This is a 31year old female coming in for follow-up on her pain and medication.     She said that she had a revision of her breast rec Absolute 0.16 0.00 - 0.30 x10(3) uL   Basophil Absolute 0.06 0.00 - 0.10 x10(3) uL   Immature Granulocyte Absolute 0.02 0.00 - 1.00 x10(3) uL   Neutrophil % 60.6 %   Lymphocyte % 27.7 %   Monocyte % 9.2 %   Eosinophil % 1.7 %   Basophil % 0.6 %   Immature Prescriptions:  hydrocodone-acetaminophen 7.5-325 MG Oral Tab Take 1 tablet by mouth every 4 (four) hours as needed for Pain. Disp: 30 tablet Rfl: 0   HYDROcodone-acetaminophen (NORCO) 5-325 MG Oral Tab Take 1 tablet by mouth daily.  Disp: 30 tablet Rfl: 0 abdominal pain, nausea, vomiting, constipation, diarrhea, or blood in stool. MUSCULOSKELETAL: Continues to have diffuse joint achiness and hands elbows shoulders knees hips.   NEUROLOGICAL:  Denies headache, seizures, dizziness, syncope, paralysis, ataxia, pulses bilaterally. NEURO:  No deficit, normal gait, strength and tone, sensory intact, normal reflexes. ASSESSMENT AND PLAN:   1. Arthropathy  She continues to have diffuse arthropathy with significant pain. Plan:  We will temporarily increase her hyd ([address]     S/P bilateral mastectomy     Ovarian retention cyst     Pulmonary nodule     Need for prophylactic vaccination and inoculation against influenza     Von Willebrand's disease ([facility]     S/P wisdom tooth extraction     Hypokalemia     Status post hy

## 2017-10-09 RX ORDER — IBUPROFEN 600 MG/1
600 TABLET ORAL EVERY 8 HOURS PRN
Qty: 60 TABLET | Refills: 3 | Status: SHIPPED
Start: 2017-10-09 | End: 2017-10-11

## 2017-10-09 RX ORDER — HYDROCODONE BITARTRATE AND ACETAMINOPHEN 5; 325 MG/1; MG/1
1 TABLET ORAL DAILY
Qty: 30 TABLET | Refills: 0 | Status: SHIPPED | OUTPATIENT
Start: 2017-10-09 | End: 2017-11-07

## 2017-10-09 NOTE — TELEPHONE ENCOUNTER
Future appt:    Last Appointment:  4/19/2017  No results found for: CHOLEST, HDL, LDL, TRIGLY, TRIG  No results found for: EAG, A1C  No results found for: T4F, TSH, TSHT4    No Follow-up on file.

## 2017-10-09 NOTE — TELEPHONE ENCOUNTER
From: Oswald Memos  Sent: 10/8/2017 4:50 PM CDT  Subject: Medication Renewal Request    Miladys Hinojosa would like a refill of the following medications:     HYDROcodone-acetaminophen (Zelpha Gilberto) 5-325 MG Oral Tab Roman Jackson MD]    Preferred pharma

## 2017-10-09 NOTE — TELEPHONE ENCOUNTER
From: Ang Barrios  Sent: 10/8/2017 4:50 PM CDT  Subject: Medication Renewal Request    Miladys Correa would like a refill of the following medications:     ibuprofen 600 MG Oral Tab KATIE Alex]    Preferred pharmacy: [facility] [phone]

## 2017-10-11 ENCOUNTER — TELEPHONE (OUTPATIENT)
Dept: FAMILY MEDICINE CLINIC | Facility: CLINIC | Age: 37
End: 2017-10-11

## 2017-10-11 RX ORDER — IBUPROFEN 600 MG/1
600 TABLET ORAL EVERY 8 HOURS PRN
Qty: 60 TABLET | Refills: 3 | Status: SHIPPED | OUTPATIENT
Start: 2017-10-11 | End: 2018-01-10

## 2017-10-11 NOTE — TELEPHONE ENCOUNTER
Patient states [facility] needing clarification on directions for Ibuprofen 600mg. Phoned [facility]-  2 different directions on Rx. One states 1 every 6hrs and the other 1 every 8 hours. Please advise correct dose for Ibuprofen 600mg.   iVnh Daniels

## 2017-11-07 NOTE — TELEPHONE ENCOUNTER
Future appt:    Last Appointment:  9/26/2017  No results found for: CHOLEST, HDL, LDL, TRIGLY, TRIG  No results found for: EAG, A1C  No results found for: T4F, TSH, TSHT4    No Follow-up on file.

## 2017-11-07 NOTE — TELEPHONE ENCOUNTER
From: Veronica Vitale  Sent: 11/7/2017 4:45 PM CST  Subject: Medication Renewal Request    Miladys Aldrich would like a refill of the following medications:     HYDROcodone-acetaminophen (Dorothey Tatiana) 5-325 MG Oral Tab Josie Sharp MD]   Patient Comment:

## 2017-11-08 RX ORDER — HYDROCODONE BITARTRATE AND ACETAMINOPHEN 5; 325 MG/1; MG/1
1 TABLET ORAL DAILY
Qty: 30 TABLET | Refills: 0 | Status: SHIPPED | OUTPATIENT
Start: 2017-11-08 | End: 2017-12-04

## 2017-12-04 RX ORDER — HYDROCODONE BITARTRATE AND ACETAMINOPHEN 5; 325 MG/1; MG/1
1 TABLET ORAL DAILY
Qty: 30 TABLET | Refills: 0 | Status: SHIPPED | OUTPATIENT
Start: 2017-12-04 | End: 2018-01-10 | Stop reason: DRUGHIGH

## 2017-12-04 NOTE — TELEPHONE ENCOUNTER
Requesting Norco Refill. Leaving for [place] x2 weeks. Wanted to update Dr Mustapha Grove that Her Teeth from Chemo are bad. Numerous cavities has had root canal.  Using prescription toothpaste/mouthwash. Possible removal of teeth with implants inserted.     Neri Meehan

## 2018-01-10 ENCOUNTER — TELEPHONE (OUTPATIENT)
Dept: FAMILY MEDICINE CLINIC | Facility: CLINIC | Age: 38
End: 2018-01-10

## 2018-01-10 ENCOUNTER — OFFICE VISIT (OUTPATIENT)
Dept: FAMILY MEDICINE CLINIC | Facility: CLINIC | Age: 38
End: 2018-01-10

## 2018-01-10 VITALS
DIASTOLIC BLOOD PRESSURE: 74 MMHG | WEIGHT: 154.38 LBS | SYSTOLIC BLOOD PRESSURE: 112 MMHG | RESPIRATION RATE: 16 BRPM | TEMPERATURE: 97 F | HEIGHT: 61 IN | BODY MASS INDEX: 29.15 KG/M2 | HEART RATE: 120 BPM

## 2018-01-10 DIAGNOSIS — B37.3 YEAST VAGINITIS: ICD-10-CM

## 2018-01-10 DIAGNOSIS — Z90.13 S/P BILATERAL MASTECTOMY: Primary | ICD-10-CM

## 2018-01-10 DIAGNOSIS — Z98.890 S/P BREAST RECONSTRUCTION, BILATERAL: ICD-10-CM

## 2018-01-10 DIAGNOSIS — Q83.8: ICD-10-CM

## 2018-01-10 DIAGNOSIS — L23.1 ALLERGIC CONTACT DERMATITIS DUE TO ADHESIVES: ICD-10-CM

## 2018-01-10 PROBLEM — D68.0 VON WILLEBRAND DISEASE: Status: ACTIVE | Noted: 2017-02-08

## 2018-01-10 PROBLEM — D68.0 VON WILLEBRAND DISEASE (HCC): Status: ACTIVE | Noted: 2017-02-08

## 2018-01-10 PROBLEM — D68.00 VON WILLEBRAND DISEASE (HCC): Status: ACTIVE | Noted: 2017-02-08

## 2018-01-10 PROBLEM — D68.00 VON WILLEBRAND DISEASE: Status: ACTIVE | Noted: 2017-02-08

## 2018-01-10 PROBLEM — B37.31 YEAST VAGINITIS: Status: ACTIVE | Noted: 2018-01-10

## 2018-01-10 PROCEDURE — 99214 OFFICE O/P EST MOD 30 MIN: CPT | Performed by: FAMILY MEDICINE

## 2018-01-10 RX ORDER — HYDROCODONE BITARTRATE AND ACETAMINOPHEN 10; 325 MG/1; MG/1
1-2 TABLET ORAL
Qty: 30 TABLET | Refills: 0 | Status: SHIPPED | OUTPATIENT
Start: 2018-01-10 | End: 2018-01-19

## 2018-01-10 RX ORDER — HYDROCODONE BITARTRATE AND ACETAMINOPHEN 10; 325 MG/1; MG/1
1-2 TABLET ORAL
COMMUNITY
Start: 2018-01-03 | End: 2018-01-10

## 2018-01-10 RX ORDER — FLUCONAZOLE 150 MG/1
150 TABLET ORAL DAILY
Qty: 3 TABLET | Refills: 0 | Status: SHIPPED | OUTPATIENT
Start: 2018-01-10 | End: 2018-11-30

## 2018-01-10 RX ORDER — DOXYCYCLINE HYCLATE 100 MG/1
1 CAPSULE ORAL 2 TIMES DAILY
Refills: 0 | COMMUNITY
Start: 2018-01-03 | End: 2018-01-13

## 2018-01-10 RX ORDER — ESTRADIOL 1 MG/1
1 TABLET ORAL DAILY
COMMUNITY

## 2018-01-10 RX ORDER — HYDROXYZINE HYDROCHLORIDE 25 MG/1
25 TABLET, FILM COATED ORAL EVERY 4 HOURS PRN
Qty: 30 TABLET | Refills: 1 | Status: SHIPPED | OUTPATIENT
Start: 2018-01-10 | End: 2018-11-30

## 2018-01-10 RX ORDER — CHLORHEXIDINE GLUCONATE 0.12 MG/ML
15 RINSE ORAL 2 TIMES DAILY
Refills: 0 | COMMUNITY
Start: 2017-12-01 | End: 2019-01-10

## 2018-01-10 RX ORDER — IBUPROFEN 600 MG/1
600 TABLET ORAL EVERY 8 HOURS PRN
Qty: 60 TABLET | Refills: 3 | Status: SHIPPED | OUTPATIENT
Start: 2018-01-10 | End: 2018-09-29

## 2018-01-10 RX ORDER — 1.1% SODIUM FLUORIDE PRESCRIPTION DENTAL CREAM 5 MG/G
1 CREAM DENTAL 2 TIMES DAILY
Refills: 4 | COMMUNITY
Start: 2017-12-01 | End: 2019-01-10

## 2018-01-10 RX ORDER — DOCUSATE SODIUM 100 MG/1
100 CAPSULE, LIQUID FILLED ORAL DAILY
COMMUNITY
Start: 2009-03-11

## 2018-01-10 NOTE — PROGRESS NOTES
[address]  PROGRESS NOTE  Chief Complaint:   Patient presents with: Adverse Reaction: Tape?   Medication Request: Ibuprofen/Norco 10/325  Yeast Infection: Recent Antibiotics      HPI:   This is a 30year old female coming in for a bad r 5. 65 1.30 - 6.70 x10 (3) uL   Neutrophil Absolute 5.65 1.30 - 6.70 x10(3) uL   Lymphocyte Absolute 2.58 0.90 - 4.00 x10(3) uL   Monocyte Absolute 0.86 (H) 0.10 - 0.60 x10(3) uL   Eosinophil Absolute 0.16 0.00 - 0.30 x10(3) uL   Basophil Absolute 0.06 0.00 reaction(s): foggy thinking, poor memory  Adhesive Tape           Rash  Penicillins                 Comment:Other reaction(s): Hives/Urticaria  Progesterone            Hives    Comment:Hives/Itching  Pseudoephedrine         Palpitations  Current Meds:    C furosemide 40 MG Oral Tab Take 1 tablet (40 mg total) by mouth daily. Disp: 90 tablet Rfl: 3   Potassium Chloride ER (KLOR-CON 10) 10 MEQ Oral Tab CR Take 1 tablet (10 mEq total) by mouth 2 (two) times daily.  Disp: 180 tablet Rfl: 3   Cholecalciferol (VI 12/07/2016 (Approximate)   BMI 29.17 kg/m²  Estimated body mass index is 29.17 kg/m² as calculated from the following:    Height as of this encounter: 61\". Weight as of this encounter: 154 lb 6.4 oz. Vital signs reviewed.   Physical Exam:  GEN:  Fabby represents yeast vaginitis. Plan: Yuko Mendoza out the current prescription for doxycycline. Take Diflucan 150 mg p.o. now and then take another dose when the antibiotics are completed.       Meds & Refills for this Visit:  Signed Prescriptions Disp Refills infertility     Malignant neoplasm of breast with BRCA1 gene mutation ([facility]     Type 1 von Willebrand disease ([facility] [facility]     Menopausal syndrome     Cellulitis of chest wall     S/P breast reconstruction, bilateral     Symmastia     Allergic contact dermatitis

## 2018-01-10 NOTE — TELEPHONE ENCOUNTER
Pt just had surgery. Has a rash from tape and states that tape took off part of her skin.  Wants to know what she should do

## 2018-01-10 NOTE — TELEPHONE ENCOUNTER
Patient again had breast reconstruction surgery last week after another implant fail. States under breasts the skin is raw from the tape. Asking what She should do for treatment. Appt given 1pm today with Dr Piedad Lee for evaluation.   Marsha Quinones, 0

## 2018-01-18 ENCOUNTER — TELEPHONE (OUTPATIENT)
Dept: FAMILY MEDICINE CLINIC | Facility: CLINIC | Age: 38
End: 2018-01-18

## 2018-01-18 NOTE — TELEPHONE ENCOUNTER
----- Message from [address] [place], [place] [address]Huma Arvizu sent at 1/18/2018  4:04 PM CST -----  Regarding: Prescription Question  Contact: [phone]  Would dr Silva Hernandez refill norco 10 mg again so I don’t have to drive to [facility]

## 2018-01-19 RX ORDER — HYDROCODONE BITARTRATE AND ACETAMINOPHEN 10; 325 MG/1; MG/1
TABLET ORAL
Qty: 30 TABLET | Refills: 0 | Status: SHIPPED | OUTPATIENT
Start: 2018-01-19 | End: 2018-02-07

## 2018-01-19 NOTE — TELEPHONE ENCOUNTER
Needs to be only one doctor prescribing narcotics. I can take this over but needs to be a clear handoff with other providers. For today, need refill from Dr mika Mcelroy.

## 2018-02-07 RX ORDER — FERROUS SULFATE 325(65) MG
1 TABLET ORAL DAILY
Qty: 90 TABLET | Refills: 3 | Status: SHIPPED | OUTPATIENT
Start: 2018-02-07 | End: 2018-11-30

## 2018-02-07 RX ORDER — HYDROCODONE BITARTRATE AND ACETAMINOPHEN 10; 325 MG/1; MG/1
TABLET ORAL
Qty: 30 TABLET | Refills: 0 | Status: SHIPPED | OUTPATIENT
Start: 2018-02-07 | End: 2018-02-28

## 2018-02-07 RX ORDER — OSELTAMIVIR PHOSPHATE 75 MG/1
75 CAPSULE ORAL DAILY
Qty: 10 CAPSULE | Refills: 0 | Status: SHIPPED | OUTPATIENT
Start: 2018-02-07 | End: 2018-02-17

## 2018-02-07 NOTE — TELEPHONE ENCOUNTER
Please advise is Patient should be treated Prophylactic with Tamiflu. Patient has had direct contact with Boyfriends Mother who lives with them. Recent Influenza Hospitalization.   Adina Olivera, 02/07/18, 4:41 PM

## 2018-02-07 NOTE — TELEPHONE ENCOUNTER
----- Message from [address] [place] [address].  Prisca Tinsley sent at 2/7/2018  2:27 PM CST -----  Regarding: Non-Urgent Medical Question  Contact: [phone]  need refill for Norco 1 daily and refill iron pills also matts mom just came home from being in hospital with influenza

## 2018-02-18 NOTE — TELEPHONE ENCOUNTER
Patient has gotten Her Potassium filled. Order for BMP Faxed to [facility] Patient Scheduling. Patient to have BMP repeated on Tuesday at [facility].   Adina Olivera, 02/10/2017, 10:35 AM gradual onset

## 2018-02-26 ENCOUNTER — TELEPHONE (OUTPATIENT)
Dept: FAMILY MEDICINE CLINIC | Facility: CLINIC | Age: 38
End: 2018-02-26

## 2018-02-26 RX ORDER — FUROSEMIDE 40 MG/1
TABLET ORAL
Qty: 90 TABLET | Refills: 4 | Status: SHIPPED | OUTPATIENT
Start: 2018-02-26 | End: 2019-03-11

## 2018-02-26 NOTE — TELEPHONE ENCOUNTER
Future appt:     Your appointments     Date & Time Appointment Department [facility])    Feb 26, 2018 10:45 AM CST Exam - Established Patient with KATIE Nova [address] [place] [address] [place] Kemp Juan Carlos)        THE [facility]

## 2018-02-26 NOTE — TELEPHONE ENCOUNTER
Patient states She was diagnosed with lymphedema at ER. States She again has swelling in the left side of Her chest/ribs. Appt given Wed 2/28 11am with Dr Benton Good for post er check.   Eyad Watts, 02/26/18, 4:58 PM

## 2018-02-28 ENCOUNTER — TELEPHONE (OUTPATIENT)
Dept: FAMILY MEDICINE CLINIC | Facility: CLINIC | Age: 38
End: 2018-02-28

## 2018-02-28 ENCOUNTER — OFFICE VISIT (OUTPATIENT)
Dept: FAMILY MEDICINE CLINIC | Facility: CLINIC | Age: 38
End: 2018-02-28

## 2018-02-28 VITALS
TEMPERATURE: 98 F | HEIGHT: 61.5 IN | SYSTOLIC BLOOD PRESSURE: 108 MMHG | DIASTOLIC BLOOD PRESSURE: 60 MMHG | RESPIRATION RATE: 16 BRPM | OXYGEN SATURATION: 98 % | BODY MASS INDEX: 29.82 KG/M2 | WEIGHT: 160 LBS | HEART RATE: 104 BPM

## 2018-02-28 DIAGNOSIS — L23.1 ALLERGIC CONTACT DERMATITIS DUE TO ADHESIVES: ICD-10-CM

## 2018-02-28 DIAGNOSIS — L03.313 CELLULITIS OF CHEST WALL: ICD-10-CM

## 2018-02-28 DIAGNOSIS — G89.4 CHRONIC PAIN SYNDROME: Primary | ICD-10-CM

## 2018-02-28 DIAGNOSIS — D68.0 TYPE 1 VON WILLEBRAND DISEASE (HCC): ICD-10-CM

## 2018-02-28 DIAGNOSIS — R60.1 GENERALIZED EDEMA: ICD-10-CM

## 2018-02-28 PROCEDURE — 99214 OFFICE O/P EST MOD 30 MIN: CPT | Performed by: FAMILY MEDICINE

## 2018-02-28 RX ORDER — HYDROCODONE BITARTRATE AND ACETAMINOPHEN 5; 325 MG/1; MG/1
1 TABLET ORAL EVERY 6 HOURS PRN
Qty: 30 TABLET | Refills: 0 | Status: SHIPPED | OUTPATIENT
Start: 2018-02-28 | End: 2018-03-12

## 2018-02-28 RX ORDER — ONDANSETRON 4 MG/1
4 TABLET, FILM COATED ORAL EVERY 8 HOURS PRN
Qty: 20 TABLET | Refills: 2 | Status: SHIPPED | OUTPATIENT
Start: 2018-02-28 | End: 2019-05-01

## 2018-02-28 RX ORDER — CEPHALEXIN 500 MG/1
500 CAPSULE ORAL 3 TIMES DAILY
Qty: 21 CAPSULE | Refills: 0 | Status: SHIPPED | OUTPATIENT
Start: 2018-02-28 | End: 2018-03-21 | Stop reason: ALTCHOICE

## 2018-02-28 NOTE — PROGRESS NOTES
[facility]  PROGRESS [facility]  Chief Complaint:   Patient presents with:  Hospital F/U      HPI:   This is a 30year old female coming in for follow-up of her emergency room visit. She said that she has been feeling bad for about 3 weeks. Neutrophil Absolute Prelim 5.65 1.30 - 6.70 x10 (3) uL   Neutrophil Absolute 5.65 1.30 - 6.70 x10(3) uL   Lymphocyte Absolute 2.58 0.90 - 4.00 x10(3) uL   Monocyte Absolute 0.86 (H) 0.10 - 0.60 x10(3) uL   Eosinophil Absolute 0.16 0.00 - 0.30 x10(3) uL [Topiramat*        Comment:Other reaction(s): foggy thinking, poor memory  Adhesive Tape           Rash  Penicillins                 Comment:Other reaction(s): Hives/Urticaria  Progesterone            Hives    Comment:Hives/Itching  Pseudoephedrine total) by mouth 2 (two) times daily. Disp: 180 tablet Rfl: 3   Cholecalciferol (VITAMIN D) 1000 units Oral Tab Take 1 capsule by mouth daily. Disp:  Rfl:    cetirizine (ZYRTEC ALLERGY) 10 MG Oral Tab Take 1 tablet by mouth daily.  Disp:  Rfl:       Counseli 12/07/2016 (Approximate)   SpO2 98%   BMI 29.74 kg/m²  Estimated body mass index is 29.74 kg/m² as calculated from the following:    Height as of this encounter: 61.5\". Weight as of this encounter: 160 lb. Vital signs reviewed.   Physical Exam:  GEN: reconstruction, to see if there is something else that she can think of to help with the reaction. We may also consider having her see the transplant surgeons as well. 3. Type 1 von Willebrand disease (HCC)  Unchanged.     4. Allergic contact dermatitis breast with BRCA1 gene mutation ([facility]     [facility]     Menopausal syndrome     Cellulitis of chest wall     S/P breast reconstruction, bilateral     Symmastia     Allergic contact dermatitis due to adhesives     Yeast vaginitis

## 2018-03-12 ENCOUNTER — PATIENT MESSAGE (OUTPATIENT)
Dept: FAMILY MEDICINE CLINIC | Facility: CLINIC | Age: 38
End: 2018-03-12

## 2018-03-12 RX ORDER — HYDROCODONE BITARTRATE AND ACETAMINOPHEN 5; 325 MG/1; MG/1
1 TABLET ORAL EVERY 6 HOURS PRN
Qty: 30 TABLET | Refills: 0 | Status: SHIPPED | OUTPATIENT
Start: 2018-03-12 | End: 2018-03-26

## 2018-03-12 NOTE — TELEPHONE ENCOUNTER
From: Amaya Galarza  To: Paul Cornelius MD  Sent: 3/12/2018 2:32 PM CDT  Subject: Other    ok I will have dr Aamir Fatima office fax it over .  I'm going to call and r/s my apt I forgot I'm leaving thurs to go visit friend in [address] [facility] for weekend also n

## 2018-03-21 ENCOUNTER — OFFICE VISIT (OUTPATIENT)
Dept: FAMILY MEDICINE CLINIC | Facility: CLINIC | Age: 38
End: 2018-03-21

## 2018-03-21 VITALS
RESPIRATION RATE: 16 BRPM | HEART RATE: 108 BPM | BODY MASS INDEX: 29 KG/M2 | SYSTOLIC BLOOD PRESSURE: 102 MMHG | TEMPERATURE: 98 F | DIASTOLIC BLOOD PRESSURE: 64 MMHG | WEIGHT: 158.25 LBS

## 2018-03-21 DIAGNOSIS — L03.313 CELLULITIS OF CHEST WALL: ICD-10-CM

## 2018-03-21 DIAGNOSIS — G89.4 CHRONIC PAIN SYNDROME: Primary | ICD-10-CM

## 2018-03-21 PROCEDURE — 99213 OFFICE O/P EST LOW 20 MIN: CPT | Performed by: FAMILY MEDICINE

## 2018-03-21 RX ORDER — CEPHALEXIN 500 MG/1
500 CAPSULE ORAL 3 TIMES DAILY
Qty: 21 CAPSULE | Refills: 0 | Status: SHIPPED | OUTPATIENT
Start: 2018-03-21 | End: 2018-05-14 | Stop reason: ALTCHOICE

## 2018-03-21 NOTE — PROGRESS NOTES
[address]  PROGRESS NOTE  Chief Complaint:   Patient presents with: Follow - Up      HPI:   This is a 30year old female coming in for follow-up on her pain on her side. She said that the antibiotic helped tremendously.   She said th Neutrophil % 60.6 %   Lymphocyte % 27.7 %   Monocyte % 9.2 %   Eosinophil % 1.7 %   Basophil % 0.6 %   Immature Granulocyte % 0.2 %       Past Medical History:   Diagnosis Date   • Allergic rhinitis    • Cancer [facility])    • Chronic pain syndrome 6/22/2016 (500 mg total) by mouth 3 (three) times daily. Disp: 21 capsule Rfl: 0   HYDROcodone-acetaminophen (NORCO) 5-325 MG Oral Tab Take 1 tablet by mouth every 6 (six) hours as needed for Pain.  Disp: 30 tablet Rfl: 0   Ondansetron HCl (ZOFRAN) 4 mg tablet Take 1 daily. Disp:  Rfl:       Counseling given: Not Answered       REVIEW OF SYSTEMS:   CONSTITUTIONAL:  Denies unusual weight gain/loss, fever, chills, or fatigue. EENT:  Eyes:  Denies eye pain, visual loss, blurred vision, double vision or yellow sclerae.  Ea clear bilaterally, no eye discharge Ears: External normal. Nose: patent, no nasal discharge Throat:  No tonsillar erythema or exudate. Mouth:  No oral lesions or ulcerations, good dentition. NECK: Supple, no CLAD, no JVD, no thyromegaly.   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

## 2018-03-26 RX ORDER — HYDROCODONE BITARTRATE AND ACETAMINOPHEN 5; 325 MG/1; MG/1
1 TABLET ORAL EVERY 6 HOURS PRN
Qty: 30 TABLET | Refills: 0 | Status: SHIPPED | OUTPATIENT
Start: 2018-03-26 | End: 2018-04-09

## 2018-03-26 NOTE — TELEPHONE ENCOUNTER
Future appt:     Your appointments     Date & Time Appointment Department [facility])    Apr 18, 2018 11:30 AM CDT Follow up - Extended with Soto Malagon MD [address] [facility], Javier Schmitt ([facility]

## 2018-03-26 NOTE — TELEPHONE ENCOUNTER
From: Timi Gould  Sent: 3/26/2018 1:05 PM CDT  Subject: Medication Renewal Request    Miladys Valle would like a refill of the following medications:     HYDROcodone-acetaminophen (Forrestine Edward) 5-325 MG Oral Tab Shaheen Ledesma MD]    Preferred pharma

## 2018-04-09 ENCOUNTER — PATIENT MESSAGE (OUTPATIENT)
Dept: FAMILY MEDICINE CLINIC | Facility: CLINIC | Age: 38
End: 2018-04-09

## 2018-04-09 RX ORDER — POTASSIUM CHLORIDE 750 MG/1
10 TABLET, FILM COATED, EXTENDED RELEASE ORAL 2 TIMES DAILY
Qty: 180 TABLET | Refills: 3 | Status: SHIPPED | OUTPATIENT
Start: 2018-04-09 | End: 2019-09-03

## 2018-04-09 RX ORDER — HYDROCODONE BITARTRATE AND ACETAMINOPHEN 5; 325 MG/1; MG/1
1 TABLET ORAL EVERY 6 HOURS PRN
Qty: 30 TABLET | Refills: 0 | Status: SHIPPED | OUTPATIENT
Start: 2018-04-09 | End: 2018-04-20

## 2018-04-09 NOTE — TELEPHONE ENCOUNTER
From: Yuri Cordero  To:  Carson William MD  Sent: 4/9/2018 10:09 AM CDT  Subject: Other    Potassium pills refilled and norco refill

## 2018-04-20 ENCOUNTER — PATIENT MESSAGE (OUTPATIENT)
Dept: FAMILY MEDICINE CLINIC | Facility: CLINIC | Age: 38
End: 2018-04-20

## 2018-04-20 RX ORDER — HYDROCODONE BITARTRATE AND ACETAMINOPHEN 5; 325 MG/1; MG/1
1 TABLET ORAL DAILY
Qty: 30 TABLET | Refills: 0 | Status: SHIPPED | OUTPATIENT
Start: 2018-04-20 | End: 2018-05-21

## 2018-04-20 NOTE — TELEPHONE ENCOUNTER
From: Carlos Mohamud  To:  Scotty Alarcon MD  Sent: 4/20/2018 11:19 AM CDT  Subject: Other    Need refill on norco 1 a day

## 2018-05-14 ENCOUNTER — OFFICE VISIT (OUTPATIENT)
Dept: FAMILY MEDICINE CLINIC | Facility: CLINIC | Age: 38
End: 2018-05-14

## 2018-05-14 VITALS
HEIGHT: 61.5 IN | HEART RATE: 94 BPM | RESPIRATION RATE: 16 BRPM | TEMPERATURE: 96 F | SYSTOLIC BLOOD PRESSURE: 112 MMHG | DIASTOLIC BLOOD PRESSURE: 70 MMHG | WEIGHT: 159 LBS | BODY MASS INDEX: 29.64 KG/M2

## 2018-05-14 DIAGNOSIS — M12.9 ARTHROPATHY: Primary | ICD-10-CM

## 2018-05-14 DIAGNOSIS — C50.912 BILATERAL MALIGNANT NEOPLASM OF BREAST IN FEMALE, UNSPECIFIED ESTROGEN RECEPTOR STATUS, UNSPECIFIED SITE OF BREAST (HCC): ICD-10-CM

## 2018-05-14 DIAGNOSIS — N95.1 MENOPAUSAL SYNDROME: ICD-10-CM

## 2018-05-14 DIAGNOSIS — C50.911 BILATERAL MALIGNANT NEOPLASM OF BREAST IN FEMALE, UNSPECIFIED ESTROGEN RECEPTOR STATUS, UNSPECIFIED SITE OF BREAST (HCC): ICD-10-CM

## 2018-05-14 PROCEDURE — 99213 OFFICE O/P EST LOW 20 MIN: CPT | Performed by: FAMILY MEDICINE

## 2018-05-14 NOTE — PROGRESS NOTES
[address]  PROGRESS NOTE  Chief Complaint:   Patient presents with: Follow - Up      HPI:   This is a 30year old female coming in for follow-up. She still has swelling on the left side of her chest wall.   However the pain and tende 60.6 %   Lymphocyte % 27.7 %   Monocyte % 9.2 %   Eosinophil % 1.7 %   Basophil % 0.6 %   Immature Granulocyte % 0.2 %       Past Medical History:   Diagnosis Date   • Allergic rhinitis    • Cancer [facility])    • Chronic pain syndrome 6/22/2016   • Edema 11/7/ 1 tablet by mouth daily. Disp: 30 tablet Rfl: 0   Potassium Chloride ER (KLOR-CON 10) 10 MEQ Oral Tab CR Take 1 tablet (10 mEq total) by mouth 2 (two) times daily.  Disp: 180 tablet Rfl: 3   Ondansetron HCl (ZOFRAN) 4 mg tablet Take 1 tablet (4 mg total) by is not smoking. EENT:  Eyes:  Denies eye pain, visual loss, blurred vision, double vision or yellow sclerae. Ears, Nose, Throat:  Denies hearing loss, sneezing, congestion, runny nose or sore throat.   INTEGUMENTARY:  Denies rashes, itching, skin lesion, o tonsillar erythema or exudate. Mouth:  No oral lesions or ulcerations, good dentition. NECK: Supple, no CLAD, no JVD, no thyromegaly. SKIN: No rashes, no skin lesion, no bruising, good turgor.   HEART:  Regular rate and rhythm, no murmurs, rubs or gallop result of today.      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

## 2018-05-18 ENCOUNTER — PATIENT MESSAGE (OUTPATIENT)
Dept: FAMILY MEDICINE CLINIC | Facility: CLINIC | Age: 38
End: 2018-05-18

## 2018-05-21 RX ORDER — HYDROCODONE BITARTRATE AND ACETAMINOPHEN 5; 325 MG/1; MG/1
1 TABLET ORAL DAILY
Qty: 30 TABLET | Refills: 0 | Status: SHIPPED | OUTPATIENT
Start: 2018-05-21 | End: 2018-06-18

## 2018-05-21 NOTE — TELEPHONE ENCOUNTER
Future appt:    Last Appointment:  5/14/2018  No results found for: CHOLEST, HDL, LDL, TRIGLY, TRIG  No results found for: EAG, A1C  No results found for: T4F, TSH, TSHT4    No Follow-up on file.

## 2018-06-18 ENCOUNTER — PATIENT MESSAGE (OUTPATIENT)
Dept: FAMILY MEDICINE CLINIC | Facility: CLINIC | Age: 38
End: 2018-06-18

## 2018-06-18 RX ORDER — HYDROCODONE BITARTRATE AND ACETAMINOPHEN 5; 325 MG/1; MG/1
1 TABLET ORAL DAILY
Qty: 30 TABLET | Refills: 0 | Status: SHIPPED | OUTPATIENT
Start: 2018-06-18 | End: 2018-07-17

## 2018-06-18 NOTE — TELEPHONE ENCOUNTER
From: Reji Torres  To:  Carlos sOman MD  Sent: 6/18/2018 9:59 AM CDT  Subject: Other    refill Norco

## 2018-07-17 RX ORDER — HYDROCODONE BITARTRATE AND ACETAMINOPHEN 5; 325 MG/1; MG/1
1 TABLET ORAL DAILY
Qty: 30 TABLET | Refills: 0 | Status: SHIPPED | OUTPATIENT
Start: 2018-07-17 | End: 2018-08-15

## 2018-07-17 NOTE — TELEPHONE ENCOUNTER
Future appt:     Your appointments     Date & Time Appointment Department [facility])    Jul 30, 2018  2:30 PM CDT Follow up Visit with Paul Cornelius MD [address], [place] ([facility])    Please arrive 15 minut

## 2018-07-17 NOTE — TELEPHONE ENCOUNTER
From: Timi Gould  Sent: 7/17/2018 1:41 PM CDT  Subject: Medication Renewal Request    Miladys Valle would like a refill of the following medications:     HYDROcodone-acetaminophen (Forrestine Edward) 5-325 MG Oral Tab Shaheen Ledesma MD]    Preferred pharma

## 2018-07-18 ENCOUNTER — TELEPHONE (OUTPATIENT)
Dept: FAMILY MEDICINE CLINIC | Facility: CLINIC | Age: 38
End: 2018-07-18

## 2018-07-18 NOTE — TELEPHONE ENCOUNTER
Patient states She has 2 white pimple appearing areas on labia. States She has been wearing pads lately due to sneezing and incontinence. Patient informed it is blocked oil glands due to wearing pads. Asked if anything to worry about?   Informed no/agr

## 2018-08-08 ENCOUNTER — PATIENT MESSAGE (OUTPATIENT)
Dept: FAMILY MEDICINE CLINIC | Facility: CLINIC | Age: 38
End: 2018-08-08

## 2018-08-08 NOTE — TELEPHONE ENCOUNTER
From: Kimo Carter  To:  Joan Britt MD  Sent: 8/8/2018 1:31 PM CDT  Subject: Non-Urgent Medical Question    i had hay delivered and lifted to much my chest is hurting 5mg Norco isn't helping wondering if i can get something little stronger to hel

## 2018-08-15 ENCOUNTER — PATIENT MESSAGE (OUTPATIENT)
Dept: FAMILY MEDICINE CLINIC | Facility: CLINIC | Age: 38
End: 2018-08-15

## 2018-08-15 RX ORDER — HYDROCODONE BITARTRATE AND ACETAMINOPHEN 5; 325 MG/1; MG/1
1 TABLET ORAL DAILY
Qty: 30 TABLET | Refills: 0 | Status: SHIPPED | OUTPATIENT
Start: 2018-08-15 | End: 2018-09-17

## 2018-08-15 NOTE — PROGRESS NOTES
Reviewed [employer].    Zay Pilar refilled. Has received Rx from dentist for Zay Pilar.  If any further refills from dentist, will not refill from our office.

## 2018-08-15 NOTE — TELEPHONE ENCOUNTER
From: Sebas Rich  To:  Barbie Collado MD  Sent: 8/15/2018 11:37 AM CDT  Subject: Other    Thank you I did get some lidocaine which helped … need refill on Norco if possible can u mail it to my house? i wont be in town this week working from home

## 2018-08-24 ENCOUNTER — TELEPHONE (OUTPATIENT)
Dept: FAMILY MEDICINE CLINIC | Facility: CLINIC | Age: 38
End: 2018-08-24

## 2018-08-24 DIAGNOSIS — C50.911 BILATERAL MALIGNANT NEOPLASM OF BREAST IN FEMALE, UNSPECIFIED ESTROGEN RECEPTOR STATUS, UNSPECIFIED SITE OF BREAST (HCC): ICD-10-CM

## 2018-08-24 DIAGNOSIS — R60.1 GENERALIZED EDEMA: ICD-10-CM

## 2018-08-24 DIAGNOSIS — Z90.13 S/P BILATERAL MASTECTOMY: ICD-10-CM

## 2018-08-24 DIAGNOSIS — Z15.02 GENETIC SUSCEPTIBILITY TO OVARIAN CANCER: ICD-10-CM

## 2018-08-24 DIAGNOSIS — Z15.01 MALIGNANT NEOPLASM OF BREAST WITH BRCA1 GENE MUTATION, UNSPECIFIED LATERALITY (HCC): Primary | ICD-10-CM

## 2018-08-24 DIAGNOSIS — Z15.01 GENETIC SUSCEPTIBILITY TO BREAST CANCER: ICD-10-CM

## 2018-08-24 DIAGNOSIS — C50.912 BILATERAL MALIGNANT NEOPLASM OF BREAST IN FEMALE, UNSPECIFIED ESTROGEN RECEPTOR STATUS, UNSPECIFIED SITE OF BREAST (HCC): ICD-10-CM

## 2018-08-24 DIAGNOSIS — C50.919 MALIGNANT NEOPLASM OF BREAST WITH BRCA1 GENE MUTATION, UNSPECIFIED LATERALITY (HCC): Primary | ICD-10-CM

## 2018-08-24 NOTE — TELEPHONE ENCOUNTER
Need referral to Dr Elmon Ranch @ [facility] so patient can see him. Referral does not need authorization, open ended, & multiple visits. Please fax referral to [phone].

## 2018-09-17 ENCOUNTER — PATIENT MESSAGE (OUTPATIENT)
Dept: FAMILY MEDICINE CLINIC | Facility: CLINIC | Age: 38
End: 2018-09-17

## 2018-09-17 RX ORDER — HYDROCODONE BITARTRATE AND ACETAMINOPHEN 5; 325 MG/1; MG/1
1 TABLET ORAL DAILY
Qty: 30 TABLET | Refills: 0 | Status: SHIPPED | OUTPATIENT
Start: 2018-09-17 | End: 2018-09-22

## 2018-09-17 NOTE — TELEPHONE ENCOUNTER
From: Aditya Pérez  To: Joelle Davidson MD  Sent: 9/17/2018 12:17 PM CDT  Subject: Other    refill Beeler will .  Thank you have a good day

## 2018-09-22 ENCOUNTER — OFFICE VISIT (OUTPATIENT)
Dept: FAMILY MEDICINE CLINIC | Facility: CLINIC | Age: 38
End: 2018-09-22
Payer: MEDICAID

## 2018-09-22 VITALS
TEMPERATURE: 97 F | BODY MASS INDEX: 27.24 KG/M2 | HEART RATE: 108 BPM | SYSTOLIC BLOOD PRESSURE: 108 MMHG | WEIGHT: 146.13 LBS | HEIGHT: 61.5 IN | DIASTOLIC BLOOD PRESSURE: 72 MMHG | RESPIRATION RATE: 16 BRPM

## 2018-09-22 DIAGNOSIS — M54.41 ACUTE RIGHT-SIDED LOW BACK PAIN WITH RIGHT-SIDED SCIATICA: Primary | ICD-10-CM

## 2018-09-22 DIAGNOSIS — G89.4 CHRONIC PAIN SYNDROME: ICD-10-CM

## 2018-09-22 PROCEDURE — 99214 OFFICE O/P EST MOD 30 MIN: CPT | Performed by: FAMILY MEDICINE

## 2018-09-22 RX ORDER — CYCLOBENZAPRINE HCL 10 MG
10 TABLET ORAL 3 TIMES DAILY
Qty: 30 TABLET | Refills: 1 | Status: SHIPPED | OUTPATIENT
Start: 2018-09-22 | End: 2018-10-12

## 2018-09-22 RX ORDER — HYDROCODONE BITARTRATE AND ACETAMINOPHEN 5; 325 MG/1; MG/1
2 TABLET ORAL EVERY 6 HOURS PRN
Qty: 60 TABLET | Refills: 0 | Status: SHIPPED | OUTPATIENT
Start: 2018-09-22 | End: 2018-09-29

## 2018-09-22 RX ORDER — METHYLPREDNISOLONE 4 MG/1
TABLET ORAL
Qty: 1 KIT | Refills: 0 | Status: SHIPPED | OUTPATIENT
Start: 2018-09-22 | End: 2018-09-29 | Stop reason: ALTCHOICE

## 2018-09-22 NOTE — PROGRESS NOTES
[facility] [place]  PROGRESS NOTE  Chief Complaint:   Patient presents with:  Back Pain: x2 days      HPI:   This is a 30year old female coming in for back pain.   She said that she was hoping to push a car on the evening of Thursday, September 4.00 x10(3) uL    Monocyte Absolute 0.86 (H) 0.10 - 0.60 x10(3) uL    Eosinophil Absolute 0.16 0.00 - 0.30 x10(3) uL    Basophil Absolute 0.06 0.00 - 0.10 x10(3) uL    Immature Granulocyte Absolute 0.02 0.00 - 1.00 x10(3) uL    Neutrophil % 60.6 %    Lymph Comment:Other reaction(s): Hives/Urticaria  Progesterone            HIVES    Comment:Hives/Itching  Pseudoephedrine         PALPITATIONS  Current Meds:    Current Outpatient Medications:  HYDROcodone-acetaminophen (NORCO) 5-325 MG Oral Tab Take 2 ta MG Oral Capsule Delayed Release Take 40 mg by mouth every morning before breakfast. Disp:  Rfl:    Biotin 18001 MCG Oral Tablet Dispersible Take 1 tablet by mouth daily.  Disp:  Rfl:    Cholecalciferol (VITAMIN D) 1000 units Oral Tab Take 1 capsule by mouth (Approximate)   BMI 27.16 kg/m²  Estimated body mass index is 27.16 kg/m² as calculated from the following:    Height as of this encounter: 61.5\". Weight as of this encounter: 146 lb 2 oz. Vital signs reviewed.   Physical Exam:  GEN: She is in obvious possible herniated disc. Plan: Medrol Dosepak. Increase the Norco to 2 tablets every 6 hours as needed (8 per day) for the next week. Flexeril 10 mg up to 3 times daily as needed. Recheck in 1 week.   If the pain is not improving at that point we will r infertility     Malignant neoplasm of breast with BRCA1 gene mutation ([facility]     Type 1 von Willebrand disease ([facility]     Menopausal syndrome     Cellulitis of chest wall     S/P breast reconstruction, bilateral     Symmastia     Allergic contact dermatitis

## 2018-09-29 ENCOUNTER — OFFICE VISIT (OUTPATIENT)
Dept: FAMILY MEDICINE CLINIC | Facility: CLINIC | Age: 38
End: 2018-09-29
Payer: MEDICAID

## 2018-09-29 VITALS
HEART RATE: 110 BPM | DIASTOLIC BLOOD PRESSURE: 58 MMHG | HEIGHT: 61.5 IN | SYSTOLIC BLOOD PRESSURE: 110 MMHG | BODY MASS INDEX: 27.7 KG/M2 | WEIGHT: 148.63 LBS | RESPIRATION RATE: 14 BRPM | TEMPERATURE: 98 F | OXYGEN SATURATION: 97 %

## 2018-09-29 DIAGNOSIS — M54.41 ACUTE RIGHT-SIDED LOW BACK PAIN WITH RIGHT-SIDED SCIATICA: Primary | ICD-10-CM

## 2018-09-29 DIAGNOSIS — G89.4 CHRONIC PAIN SYNDROME: ICD-10-CM

## 2018-09-29 PROCEDURE — 90686 IIV4 VACC NO PRSV 0.5 ML IM: CPT | Performed by: FAMILY MEDICINE

## 2018-09-29 PROCEDURE — 90471 IMMUNIZATION ADMIN: CPT | Performed by: FAMILY MEDICINE

## 2018-09-29 PROCEDURE — 99213 OFFICE O/P EST LOW 20 MIN: CPT | Performed by: FAMILY MEDICINE

## 2018-09-29 RX ORDER — HYDROCODONE BITARTRATE AND ACETAMINOPHEN 5; 325 MG/1; MG/1
2 TABLET ORAL EVERY 8 HOURS PRN
COMMUNITY
Start: 2018-09-29 | End: 2018-10-01

## 2018-09-29 RX ORDER — IBUPROFEN 600 MG/1
600 TABLET ORAL EVERY 8 HOURS PRN
Qty: 60 TABLET | Refills: 3 | Status: SHIPPED | OUTPATIENT
Start: 2018-09-29 | End: 2019-05-14

## 2018-09-29 NOTE — PATIENT INSTRUCTIONS
Decrease Norco to 6 tablets daily. Refill Ibuprofen. OK to return to work but avoid twisting or prolonged car rides.

## 2018-09-29 NOTE — PROGRESS NOTES
[address]  PROGRESS NOTE  Chief Complaint:   Patient presents with: Follow - Up: Back pain      HPI:   This is a 30year old female coming in for follow-up on her back pain. She says she is feeling better.   She said that her pain leve x10(3) uL    Monocyte Absolute 0.86 (H) 0.10 - 0.60 x10(3) uL    Eosinophil Absolute 0.16 0.00 - 0.30 x10(3) uL    Basophil Absolute 0.06 0.00 - 0.10 x10(3) uL    Immature Granulocyte Absolute 0.02 0.00 - 1.00 x10(3) uL    Neutrophil % 60.6 %    Lymphocyte RASH  Penicillins                 Comment:Other reaction(s): Hives/Urticaria  Progesterone            HIVES    Comment:Hives/Itching  Pseudoephedrine         PALPITATIONS  Current Meds:    Current Outpatient Medications:  ibuprofen 600 MG Oral Tab Take 1 t breakfast. Disp:  Rfl:    Biotin 67736 MCG Oral Tablet Dispersible Take 1 tablet by mouth daily. Disp:  Rfl:    Cholecalciferol (VITAMIN D) 1000 units Oral Tab Take 1 capsule by mouth daily.  Disp:  Rfl:    cetirizine (ZYRTEC ALLERGY) 10 MG Oral Tab Take 1 noted now. She is able to bend forward and touch her toes. She is able to rotate to either side. She can walk on her toes and her heels with no foot drag. EXTREMITIES:  No edema, no cyanosis, no clubbing, FROM, 2+ dorsalis pedis pulses bilaterally.   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

## 2018-10-01 ENCOUNTER — PATIENT MESSAGE (OUTPATIENT)
Dept: FAMILY MEDICINE CLINIC | Facility: CLINIC | Age: 38
End: 2018-10-01

## 2018-10-01 RX ORDER — HYDROCODONE BITARTRATE AND ACETAMINOPHEN 5; 325 MG/1; MG/1
2 TABLET ORAL EVERY 8 HOURS PRN
Qty: 84 TABLET | Refills: 0 | Status: SHIPPED | OUTPATIENT
Start: 2018-10-01 | End: 2018-10-12

## 2018-10-01 NOTE — TELEPHONE ENCOUNTER
From: Amaya Galarza  To:  Paul Cornelius MD  Sent: 10/1/2018 10:56 AM CDT  Subject: Prescription Question    I do need refill on [place] will be out wed also I know we talked little bit sat about steroids I thought he said he was gonna start me again on

## 2018-10-01 NOTE — PROGRESS NOTES
I do not recommend another round of steroids. That would be unlikely to be helpful and might cause some additional side effects.   We will refill the [place].    Reviewed [employer].

## 2018-10-01 NOTE — TELEPHONE ENCOUNTER
Please advise the use of steroids that was discussed at last visit.   Cecile Stewart, 10/01/18, 1:10 PM

## 2018-10-12 ENCOUNTER — OFFICE VISIT (OUTPATIENT)
Dept: FAMILY MEDICINE CLINIC | Facility: CLINIC | Age: 38
End: 2018-10-12
Payer: MEDICAID

## 2018-10-12 VITALS
WEIGHT: 151.63 LBS | BODY MASS INDEX: 28.26 KG/M2 | SYSTOLIC BLOOD PRESSURE: 102 MMHG | TEMPERATURE: 98 F | HEIGHT: 61.5 IN | DIASTOLIC BLOOD PRESSURE: 64 MMHG | RESPIRATION RATE: 18 BRPM | HEART RATE: 92 BPM

## 2018-10-12 DIAGNOSIS — M54.41 ACUTE RIGHT-SIDED LOW BACK PAIN WITH RIGHT-SIDED SCIATICA: Primary | ICD-10-CM

## 2018-10-12 DIAGNOSIS — G89.4 CHRONIC PAIN SYNDROME: ICD-10-CM

## 2018-10-12 PROCEDURE — 99213 OFFICE O/P EST LOW 20 MIN: CPT | Performed by: FAMILY MEDICINE

## 2018-10-12 RX ORDER — HYDROCODONE BITARTRATE AND ACETAMINOPHEN 5; 325 MG/1; MG/1
2 TABLET ORAL EVERY 6 HOURS PRN
Qty: 100 TABLET | Refills: 0 | Status: SHIPPED | OUTPATIENT
Start: 2018-10-12 | End: 2018-10-30

## 2018-10-12 RX ORDER — CYCLOBENZAPRINE HCL 10 MG
10 TABLET ORAL 3 TIMES DAILY PRN
Qty: 30 TABLET | Refills: 3 | Status: SHIPPED | OUTPATIENT
Start: 2018-10-12 | End: 2018-11-01

## 2018-10-12 NOTE — PROGRESS NOTES
[address]  PROGRESS NOTE  Chief Complaint:   Patient presents with: Follow - Up: 2 week follow up      HPI:   This is a 30year old female coming in for follow-up on her back pain. She said it slowly but steadily improving.   She feels Basophil % 0.6 %    Immature Granulocyte % 0.2 %       Past Medical History:   Diagnosis Date   • Allergic rhinitis    • Cancer [facility])    • Chronic pain syndrome 6/22/2016   • Edema 11/7/2015   • Esophageal reflux    • Genetic susceptibility to breast cance Pain (Limit 7 tablets a day). Disp: 100 tablet Rfl: 0   Cyclobenzaprine HCl 10 MG Oral Tab Take 1 tablet (10 mg total) by mouth 3 (three) times daily as needed for Muscle spasms.  Disp: 30 tablet Rfl: 3   ibuprofen 600 MG Oral Tab Take 1 tablet (600 mg tota Disp:  Rfl:       Counseling given: Not Answered       REVIEW OF SYSTEMS:   CONSTITUTIONAL:  Denies unusual weight gain/loss, fever, chills, or fatigue. EENT:  Eyes:  Denies eye pain, visual loss, blurred vision, double vision or yellow sclerae.  Ears, Nos any assistance. BACK: She has diffuse spasm through the low back. There is no redness or swelling. She is able to walk on her toes and her heels. Her range of motion is limited flexing backwards due to pain.   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

## 2018-10-30 ENCOUNTER — OFFICE VISIT (OUTPATIENT)
Dept: FAMILY MEDICINE CLINIC | Facility: CLINIC | Age: 38
End: 2018-10-30
Payer: MEDICAID

## 2018-10-30 VITALS
OXYGEN SATURATION: 98 % | RESPIRATION RATE: 16 BRPM | SYSTOLIC BLOOD PRESSURE: 106 MMHG | DIASTOLIC BLOOD PRESSURE: 72 MMHG | WEIGHT: 149 LBS | HEIGHT: 61.5 IN | BODY MASS INDEX: 27.77 KG/M2 | TEMPERATURE: 98 F | HEART RATE: 112 BPM

## 2018-10-30 DIAGNOSIS — M54.41 ACUTE RIGHT-SIDED LOW BACK PAIN WITH RIGHT-SIDED SCIATICA: ICD-10-CM

## 2018-10-30 DIAGNOSIS — D68.0 TYPE 1 VON WILLEBRAND DISEASE (HCC): ICD-10-CM

## 2018-10-30 DIAGNOSIS — G89.4 CHRONIC PAIN SYNDROME: Primary | ICD-10-CM

## 2018-10-30 PROCEDURE — 99213 OFFICE O/P EST LOW 20 MIN: CPT | Performed by: FAMILY MEDICINE

## 2018-10-30 RX ORDER — HYDROCODONE BITARTRATE AND ACETAMINOPHEN 5; 325 MG/1; MG/1
2 TABLET ORAL EVERY 8 HOURS PRN
Qty: 180 TABLET | Refills: 0 | Status: SHIPPED | OUTPATIENT
Start: 2018-10-30 | End: 2018-11-30

## 2018-10-30 NOTE — PROGRESS NOTES
[address]  PROGRESS NOTE  Chief Complaint:   Patient presents with: Follow - Up: Pain/meds  Cough      HPI:   This is a 30year old female coming in for follow-up on her back pain. She said that her back is feeling much better now.   Shaista Morton (H) 0.10 - 0.60 x10(3) uL    Eosinophil Absolute 0.16 0.00 - 0.30 x10(3) uL    Basophil Absolute 0.06 0.00 - 0.10 x10(3) uL    Immature Granulocyte Absolute 0.02 0.00 - 1.00 x10(3) uL    Neutrophil % 60.6 %    Lymphocyte % 27.7 %    Monocyte % 9.2 %    Eos reaction(s): Hives/Urticaria  Progesterone            HIVES    Comment:Hives/Itching  Pseudoephedrine         PALPITATIONS  Current Meds:    Current Outpatient Medications:  HYDROcodone-acetaminophen (NORCO) 5-325 MG Oral Tab Take 2 tablets by mouth every before breakfast. Disp:  Rfl:    Biotin 63314 MCG Oral Tablet Dispersible Take 1 tablet by mouth daily. Disp:  Rfl:    Cholecalciferol (VITAMIN D) 1000 units Oral Tab Take 1 capsule by mouth daily.  Disp:  Rfl:    cetirizine (ZYRTEC ALLERGY) 10 MG Oral Tab reviewed. Appears stated age, well groomed. Physical Exam:  GEN: She is smiling today and moving much better than she was at her last visit. She is able to climb up and down off the exam table without any distress.   HEENT:  Head:  Normocephalic, atrauma Pain (Limit 6 tablets per day).        Health Maintenance:  Annual Physical due on 12/14/1984  Pneumococcal PPSV23/PCV13 Highest Risk Adult(2 of 3 - PCV13) due on 02/16/2018  Annual Depression Screen due on 05/08/2018    Patient/Caregiver Education: Patient

## 2018-11-19 ENCOUNTER — PATIENT MESSAGE (OUTPATIENT)
Dept: FAMILY MEDICINE CLINIC | Facility: CLINIC | Age: 38
End: 2018-11-19

## 2018-11-19 NOTE — TELEPHONE ENCOUNTER
From: Timi Gould  To:  Hansel Leventhal, MD  Sent: 11/19/2018 2:43 PM CST  Subject: Other    for some reason they didn't schedule my appt found that out when i went to go there so now my appt is scheduled for 1/10/18 that's the first available i did a

## 2018-11-19 NOTE — TELEPHONE ENCOUNTER
Patient has Appt with Claus Baires in [facility] 1/10/19. Please advise if Patient should keep 11/30 visit with Dr. Chrsitie aCi.   Hemanth Mensah, 11/19/18, 3:01 PM

## 2018-11-30 ENCOUNTER — OFFICE VISIT (OUTPATIENT)
Dept: FAMILY MEDICINE CLINIC | Facility: CLINIC | Age: 38
End: 2018-11-30
Payer: MEDICAID

## 2018-11-30 VITALS
OXYGEN SATURATION: 97 % | HEIGHT: 61.5 IN | SYSTOLIC BLOOD PRESSURE: 112 MMHG | DIASTOLIC BLOOD PRESSURE: 64 MMHG | HEART RATE: 104 BPM | WEIGHT: 147.81 LBS | BODY MASS INDEX: 27.55 KG/M2 | RESPIRATION RATE: 18 BRPM | TEMPERATURE: 98 F

## 2018-11-30 DIAGNOSIS — M54.41 ACUTE RIGHT-SIDED LOW BACK PAIN WITH RIGHT-SIDED SCIATICA: Primary | ICD-10-CM

## 2018-11-30 DIAGNOSIS — F41.1 ANXIETY STATE: ICD-10-CM

## 2018-11-30 DIAGNOSIS — G89.4 CHRONIC PAIN SYNDROME: ICD-10-CM

## 2018-11-30 PROCEDURE — 99214 OFFICE O/P EST MOD 30 MIN: CPT | Performed by: FAMILY MEDICINE

## 2018-11-30 RX ORDER — CYCLOBENZAPRINE HCL 10 MG
10 TABLET ORAL 3 TIMES DAILY PRN
Qty: 30 TABLET | Refills: 3 | Status: SHIPPED | OUTPATIENT
Start: 2018-11-30 | End: 2019-09-03

## 2018-11-30 RX ORDER — HYDROCODONE BITARTRATE AND ACETAMINOPHEN 5; 325 MG/1; MG/1
2 TABLET ORAL EVERY 8 HOURS PRN
Qty: 180 TABLET | Refills: 0 | Status: SHIPPED | OUTPATIENT
Start: 2018-11-30 | End: 2018-12-18

## 2018-11-30 RX ORDER — FERROUS SULFATE 325(65) MG
1 TABLET ORAL DAILY
Qty: 90 TABLET | Refills: 3 | Status: SHIPPED | OUTPATIENT
Start: 2018-11-30 | End: 2020-12-11

## 2018-11-30 RX ORDER — CYCLOBENZAPRINE HCL 10 MG
1 TABLET ORAL 3 TIMES DAILY PRN
Refills: 3 | COMMUNITY
Start: 2018-11-18 | End: 2018-11-30

## 2018-11-30 NOTE — PROGRESS NOTES
[address]  PROGRESS NOTE  Chief Complaint:   Patient presents with: Follow - Up      HPI:   This is a 30year old female coming in for follow-up. She said that she still has pain down her right leg but it is getting better.   She is ab Monocyte Absolute 0.86 (H) 0.10 - 0.60 x10(3) uL    Eosinophil Absolute 0.16 0.00 - 0.30 x10(3) uL    Basophil Absolute 0.06 0.00 - 0.10 x10(3) uL    Immature Granulocyte Absolute 0.02 0.00 - 1.00 x10(3) uL    Neutrophil % 60.6 %    Lymphocyte % 27.7 % Comment:Other reaction(s): Hives/Urticaria  Progesterone            HIVES    Comment:Hives/Itching  Pseudoephedrine         PALPITATIONS  Current Meds:    Current Outpatient Medications:  HYDROcodone-acetaminophen (NORCO) 5-325 MG Oral Tab Take 2 tablets b REVIEW OF SYSTEMS:   CONSTITUTIONAL: She has not felt ill. She does have some difficulty sleeping because of the severity of her pain. EENT:  Eyes:  Denies eye pain, visual loss, blurred vision, double vision or yellow sclerae.  Ears, Nose, Throat:  D Head:  Normocephalic, atraumatic Eyes: EOMI, PERRLA, no scleral icterus, conjunctivae clear bilaterally, no eye discharge Ears: External normal. Nose: patent, no nasal discharge Throat:  No tonsillar erythema or exudate.   Mouth:  No oral lesions or ulcera total) by mouth 3 (three) times daily as needed.        Health Maintenance:  Annual Physical due on 12/14/1984  Pneumococcal PPSV23/PCV13 Highest Risk Adult(2 of 3 - PCV13) due on 02/16/2018  Annual Depression Screen due on 05/08/2018    Patient/Caregiver E

## 2018-12-18 ENCOUNTER — PATIENT MESSAGE (OUTPATIENT)
Dept: FAMILY MEDICINE CLINIC | Facility: CLINIC | Age: 38
End: 2018-12-18

## 2018-12-18 RX ORDER — HYDROCODONE BITARTRATE AND ACETAMINOPHEN 5; 325 MG/1; MG/1
2 TABLET ORAL EVERY 8 HOURS PRN
Qty: 180 TABLET | Refills: 0 | Status: SHIPPED | OUTPATIENT
Start: 2018-12-18 | End: 2019-01-21

## 2018-12-18 NOTE — TELEPHONE ENCOUNTER
Future appt:     Your appointments     Date & Time Appointment Department [facility])    Carlos 10, 2019 11:10 AM CST Exam - New Patient with Myrna Combs MD [facility], [address], [address] [place] [address], [facility]    Jon Biggs

## 2018-12-18 NOTE — TELEPHONE ENCOUNTER
From: Sina Graham  To:  Nicole Matthews MD  Sent: 12/18/2018 3:54 PM CST  Subject: Other    we are leaving next wed going to celebrate new years with friends in [place] and will be out of my [place] and if u can mail to home address please

## 2018-12-20 ENCOUNTER — PATIENT MESSAGE (OUTPATIENT)
Dept: FAMILY MEDICINE CLINIC | Facility: CLINIC | Age: 38
End: 2018-12-20

## 2018-12-20 NOTE — TELEPHONE ENCOUNTER
From: Guerline Winchester  To: Celi Chavez MD  Sent: 12/20/2018 11:40 AM CST  Subject: Other    will you call [facility] and inform them to fill couple days early since will be out of state?  thank you and Hope zahra mckay have a great [place] and New Years

## 2019-01-10 ENCOUNTER — OFFICE VISIT (OUTPATIENT)
Dept: NEUROLOGY | Facility: CLINIC | Age: 39
End: 2019-01-10
Payer: MEDICAID

## 2019-01-10 ENCOUNTER — TELEPHONE (OUTPATIENT)
Dept: NEUROLOGY | Facility: CLINIC | Age: 39
End: 2019-01-10

## 2019-01-10 ENCOUNTER — PATIENT MESSAGE (OUTPATIENT)
Dept: FAMILY MEDICINE CLINIC | Facility: CLINIC | Age: 39
End: 2019-01-10

## 2019-01-10 VITALS — SYSTOLIC BLOOD PRESSURE: 134 MMHG | RESPIRATION RATE: 15 BRPM | DIASTOLIC BLOOD PRESSURE: 73 MMHG | HEART RATE: 78 BPM

## 2019-01-10 DIAGNOSIS — M54.41 ACUTE MIDLINE LOW BACK PAIN WITH RIGHT-SIDED SCIATICA: ICD-10-CM

## 2019-01-10 DIAGNOSIS — M54.16 LUMBAR RADICULOPATHY: Primary | ICD-10-CM

## 2019-01-10 PROCEDURE — 99244 OFF/OP CNSLTJ NEW/EST MOD 40: CPT | Performed by: PHYSICAL MEDICINE & REHABILITATION

## 2019-01-10 NOTE — PATIENT INSTRUCTIONS
Plan  She will get lumbar spine x-rays and a MRI scan. She will start PT on the lumbar spine. The patient will continue with their current pain medications.     She will call me once she has had the imaging studies and I will review them and my offi

## 2019-01-10 NOTE — TELEPHONE ENCOUNTER
Faxed clinicals for authorization approval review on Case #[id number] to [id number] [address] per Milus Shun

## 2019-01-10 NOTE — PROGRESS NOTES
Low Back Pain H & P    Chief Complaint:  Patient presents with:  Low Back Pain: new patient here with c/o low back pain radiating down both legs that started after work injury.  pt denies treatment or imaging       HPI:  Sebas Rich is a 41year old yea rhinitis    • Cancer [facility])    • Chronic pain syndrome 6/22/2016   • Edema 11/7/2015   • Esophageal reflux    • Genetic susceptibility to breast cancer 2/8/2017   • Genetic susceptibility to ovarian cancer 10/6/2011   • Malignant neoplasm of breast (female) Asked    Social History Narrative      The patient does not use an assistive device. .        The patient does live in a home with stairs.       Review of Systems  Constitutional:   Negative for chills, fatigue, fever, but night sweats since her hysterectomy patient pain in the central low back.      Lumbar Spine Palpation:    Spinous Processes: Tender at L1, L2, L3, L4, L5 and S1   Z-joints: Tender at  right L3-4, right L4-5 and right L5-S1   SIJ: Tender at right SIJ   Piriformis Muscle: Tender at right Pirifo scan.    She will start PT on the lumbar spine. The patient will continue with their current pain medications.     She will call me once she has had the imaging studies and I will review them and my office will get back in touch with the patient with any

## 2019-01-11 RX ORDER — DIAZEPAM 10 MG/1
10 TABLET ORAL EVERY 6 HOURS PRN
Qty: 2 TABLET | Refills: 0 | Status: SHIPPED | OUTPATIENT
Start: 2019-01-11 | End: 2019-02-04

## 2019-01-11 NOTE — TELEPHONE ENCOUNTER
From: Juni Ibrahim  To:  Melanie Oviedo MD  Sent: 1/10/2019 4:43 PM CST  Subject: Other    I saw dr Charlie Ackerman and have an order for mri and xrays however last time I had mri for my breast I have some major anxiety issues during mri so I remember we talke

## 2019-01-15 NOTE — TELEPHONE ENCOUNTER
Patient indicated she wants to go to [facility] for the MRI. Patient is requesting a change in facility. Called [facility] for modification, spoke to Carlitos VASQUEZ at [phone] who made the change.  Verified online with [facility] and printed aut

## 2019-01-21 ENCOUNTER — PATIENT MESSAGE (OUTPATIENT)
Dept: FAMILY MEDICINE CLINIC | Facility: CLINIC | Age: 39
End: 2019-01-21

## 2019-01-21 ENCOUNTER — TELEPHONE (OUTPATIENT)
Dept: NEUROLOGY | Facility: CLINIC | Age: 39
End: 2019-01-21

## 2019-01-21 RX ORDER — HYDROCODONE BITARTRATE AND ACETAMINOPHEN 5; 325 MG/1; MG/1
2 TABLET ORAL EVERY 8 HOURS PRN
Qty: 180 TABLET | Refills: 0 | Status: SHIPPED | OUTPATIENT
Start: 2019-01-21 | End: 2019-02-15

## 2019-01-21 NOTE — TELEPHONE ENCOUNTER
Pola Daly, 01/21/19, 10:26 AM    Future appt:     Your appointments     Date & Time Appointment Department [facility])    Jan 29, 2019 11:00 AM CST Follow up with Raffaele Leon MD [address] [place] [address], Ovid Council[address] DR PATRICIA TORRES [facility]

## 2019-01-21 NOTE — TELEPHONE ENCOUNTER
From: Aditya Pérez  To: Joelle Davidson MD  Sent: 1/21/2019 9:55 AM CST  Subject: Non-Urgent Medical Question    my appt for 1-29 needs to cancel I did r/s til 2/4 . Faina Lazo  also thank you I do have a  today for mri and will have them send you a repor

## 2019-01-23 ENCOUNTER — MED REC SCAN ONLY (OUTPATIENT)
Dept: NEUROLOGY | Facility: CLINIC | Age: 39
End: 2019-01-23

## 2019-01-28 ENCOUNTER — TELEPHONE (OUTPATIENT)
Dept: NEUROLOGY | Facility: CLINIC | Age: 39
End: 2019-01-28

## 2019-01-29 NOTE — TELEPHONE ENCOUNTER
Spoke to patient and informed her disc of MRI Lumbar Spine was broken. Patient states she is really busy at work and will obtain disc as soon as possible to be resent.  Patient asked that we call [address] [place] [address] to notify them so she will not be jeff

## 2019-02-01 ENCOUNTER — TELEPHONE (OUTPATIENT)
Dept: NEUROLOGY | Facility: CLINIC | Age: 39
End: 2019-02-01

## 2019-02-02 ENCOUNTER — TELEPHONE (OUTPATIENT)
Dept: FAMILY MEDICINE CLINIC | Facility: CLINIC | Age: 39
End: 2019-02-02

## 2019-02-02 NOTE — TELEPHONE ENCOUNTER
Patient states She has an extreme headache/nauseated/eyes hurt/crying. Patient states She has been up for nearly 24hrs. States she has way overdone it for herself scheduling wise this past week.     Patient advised due to the extreme headache, On license of [facility] ER fo

## 2019-02-04 PROBLEM — G44.209 ACUTE NON INTRACTABLE TENSION-TYPE HEADACHE: Status: ACTIVE | Noted: 2019-02-04

## 2019-02-04 NOTE — PROGRESS NOTES
[address]  PROGRESS NOTE  Chief Complaint:   Patient presents with: Follow - Up      HPI:   This is a 41year old female coming in for follow-up. She said that she has just been through a really stressful time stretch.   Because of the Immature Granulocyte Absolute 0.02 0.00 - 1.00 x10(3) uL    Neutrophil % 60.6 %    Lymphocyte % 27.7 %    Monocyte % 9.2 %    Eosinophil % 1.7 %    Basophil % 0.6 %    Immature Granulocyte % 0.2 %       Past Medical History:   Diagnosis Date   • Allergic r Meds:    Current Outpatient Medications:  Esomeprazole Magnesium (NEXIUM 24HR) 20 MG Oral Tab EC Take 1 tablet by mouth daily.  Disp:  Rfl:    HYDROcodone-acetaminophen (NORCO) 5-325 MG Oral Tab Take 2 tablets by mouth every 8 (eight) hours as needed for Pa chest discomfort, palpitations, edema, dyspnea on exertion or at rest.  RESPIRATORY:  Denies shortness of breath, wheezing, cough or sputum. GASTROINTESTINAL:  Denies abdominal pain, nausea, vomiting, constipation, diarrhea, or blood in stool.   [facility] [place] no rales/rhonchi/wheezing. ASSESSMENT AND PLAN:   1. Anxiety state  Her anxiety level is much better now. She has learned several nonmedication techniques for helping to cope with the stress and the anxiety.     2. Chronic pain syndrome  Chronic pain bilateral     Symmastia     Allergic contact dermatitis due to adhesives     Yeast vaginitis     Acute right-sided low back pain with right-sided sciatica     right S1 radiculopathy     Acute midline low back pain with right-sided sciatica     Acute non in

## 2019-02-06 NOTE — TELEPHONE ENCOUNTER
Received disc of MRI of lumbar spine and X-ray of lumbar spine  done 1/29/19 at [address] [place] [address] Rd report  Disc loaded to [facility] PACs and mailed back to patient.  KALA

## 2019-02-11 ENCOUNTER — OFFICE VISIT (OUTPATIENT)
Dept: FAMILY MEDICINE CLINIC | Facility: CLINIC | Age: 39
End: 2019-02-11
Payer: COMMERCIAL

## 2019-02-11 VITALS
SYSTOLIC BLOOD PRESSURE: 132 MMHG | DIASTOLIC BLOOD PRESSURE: 74 MMHG | WEIGHT: 155 LBS | TEMPERATURE: 99 F | BODY MASS INDEX: 29 KG/M2 | HEART RATE: 85 BPM | OXYGEN SATURATION: 98 %

## 2019-02-11 DIAGNOSIS — H66.014 RECURRENT ACUTE SUPPURATIVE OTITIS MEDIA OF RIGHT EAR WITH SPONTANEOUS RUPTURE OF TYMPANIC MEMBRANE: Primary | ICD-10-CM

## 2019-02-11 DIAGNOSIS — M26.621 ARTHRALGIA OF RIGHT TEMPOROMANDIBULAR JOINT: ICD-10-CM

## 2019-02-11 PROCEDURE — 99214 OFFICE O/P EST MOD 30 MIN: CPT | Performed by: NURSE PRACTITIONER

## 2019-02-11 RX ORDER — SULFAMETHOXAZOLE AND TRIMETHOPRIM 800; 160 MG/1; MG/1
1 TABLET ORAL 2 TIMES DAILY
Qty: 20 TABLET | Refills: 0 | Status: SHIPPED | OUTPATIENT
Start: 2019-02-11 | End: 2019-02-11

## 2019-02-11 RX ORDER — SULFAMETHOXAZOLE AND TRIMETHOPRIM 800; 160 MG/1; MG/1
1 TABLET ORAL 2 TIMES DAILY
Qty: 20 TABLET | Refills: 0 | Status: SHIPPED | OUTPATIENT
Start: 2019-02-11 | End: 2019-02-21

## 2019-02-11 RX ORDER — PREDNISONE 20 MG/1
20 TABLET ORAL 2 TIMES DAILY
Qty: 10 TABLET | Refills: 0 | Status: SHIPPED | OUTPATIENT
Start: 2019-02-11 | End: 2019-02-11

## 2019-02-11 RX ORDER — PREDNISONE 20 MG/1
20 TABLET ORAL 2 TIMES DAILY
Qty: 10 TABLET | Refills: 0 | Status: SHIPPED | OUTPATIENT
Start: 2019-02-11 | End: 2019-02-16

## 2019-02-11 NOTE — PATIENT INSTRUCTIONS
Take Ibuprofen 600mg three times a day with food, or aleve 2 pills twice a day with food. Avoid chewy foods, excessive talking and gum, and foods that require you to open mouth widely.   Ice to jaw if helpful, bite guard at night. -follow up if symptoms pe

## 2019-02-11 NOTE — PROGRESS NOTES
CHIEF COMPLAINT:   Patient presents with:  Ear Pain: right      HPI:   Kimmie Lucas is a 41year old female who presents to clinic today with complaints of pain to right jaw - severe   Pain is a 20 on the 0-10 scale  3am woke up with sever pain - Dispersible Take 1 tablet by mouth daily. Disp:  Rfl:    Cholecalciferol (VITAMIN D) 1000 units Oral Tab Take 1 capsule by mouth daily. Disp:  Rfl:    cetirizine (ZYRTEC ALLERGY) 10 MG Oral Tab Take 1 tablet by mouth daily.  Disp:  Rfl:       Past Medical H moist. Posterior pharynx not erythematous or injected. No exudates. NECK: supple, non-tender  THYROID: Normal size, no nodules  LUNGS: clear to auscultation bilaterally, no wheezes or rhonchi. Breathing is non labored.   CARDIO: RRR without murmur  SKIN: n

## 2019-02-15 ENCOUNTER — PATIENT MESSAGE (OUTPATIENT)
Dept: FAMILY MEDICINE CLINIC | Facility: CLINIC | Age: 39
End: 2019-02-15

## 2019-02-15 RX ORDER — HYDROCODONE BITARTRATE AND ACETAMINOPHEN 5; 325 MG/1; MG/1
2 TABLET ORAL EVERY 8 HOURS PRN
Qty: 180 TABLET | Refills: 0 | Status: SHIPPED | OUTPATIENT
Start: 2019-02-15 | End: 2019-03-11

## 2019-02-15 NOTE — TELEPHONE ENCOUNTER
From: Jazmine Kelley  To:  Celi Chavez MD  Sent: 2/15/2019 10:47 AM CST  Subject: Non-Urgent Medical Question    refill Norco

## 2019-02-15 NOTE — TELEPHONE ENCOUNTER
Please advise Shabbir Escalona. Maritza Knee, 02/15/19, 10:50 AM      Future appt:     Your appointments     Date & Time Appointment Department [facility])    May 14, 2019 10:30 AM CDT Follow up - Extended with Ayan Mckeon MD [facility] [address]

## 2019-02-19 NOTE — TELEPHONE ENCOUNTER
I have looked for these films on 2 different occasions and have not been able to find them in PACS. I see that the reports have been scanned, but I would like to see the films.

## 2019-03-03 PROBLEM — M51.9 LUMBAR DISC DISEASE: Status: ACTIVE | Noted: 2019-03-03

## 2019-03-03 NOTE — TELEPHONE ENCOUNTER
I have reviewed her x-rays which are ok and the MRI of the lumbar spine which shows a L5-S1 mild bulging disc and slight bulging disc at L4-5.   Please see how she is doing with the PT.

## 2019-03-04 NOTE — TELEPHONE ENCOUNTER
Left detailed message informing the patient of the below results and message per Dr. Brianne Wong.  Patient instructed to call back with update.-verified in [facility]

## 2019-03-11 ENCOUNTER — PATIENT MESSAGE (OUTPATIENT)
Dept: FAMILY MEDICINE CLINIC | Facility: CLINIC | Age: 39
End: 2019-03-11

## 2019-03-11 RX ORDER — HYDROCODONE BITARTRATE AND ACETAMINOPHEN 5; 325 MG/1; MG/1
2 TABLET ORAL EVERY 8 HOURS PRN
Qty: 180 TABLET | Refills: 0 | Status: SHIPPED | OUTPATIENT
Start: 2019-03-11 | End: 2019-04-09

## 2019-03-11 RX ORDER — FUROSEMIDE 40 MG/1
TABLET ORAL
Qty: 90 TABLET | Refills: 1 | Status: SHIPPED | OUTPATIENT
Start: 2019-03-11 | End: 2019-09-03

## 2019-03-11 NOTE — TELEPHONE ENCOUNTER
From: Williams Olivarez  To: Soto Malagon MD  Sent: 3/11/2019 3:08 PM CDT  Subject: Other    We’re leaving 3/17 coming back 3/22 to [place] need Mere[place] refilled will be out and then if u can call pharmacy to inform . .. also got my mri results 1 selma

## 2019-03-11 NOTE — TELEPHONE ENCOUNTER
Please advise [place] Refill. MRI/Spine XRays results are in [facility] [address].   Brandi Miranda, 03/11/19, 4:36 PM

## 2019-03-11 NOTE — TELEPHONE ENCOUNTER
Future appt:     Your appointments     Date & Time Appointment Department [facility])    May 14, 2019 10:30 AM CDT Follow up - Extended with Sim Michael MD [address] [place] [address], [place] ([facility]

## 2019-03-12 NOTE — TELEPHONE ENCOUNTER
Future appt:     Your appointments     Date & Time Appointment Department [facility])    May 14, 2019 10:30 AM CDT Follow up - Extended with Liz Sherwood MD [address] [facility], [facility] ([facility])        [address] [place] [address]

## 2019-04-09 ENCOUNTER — PATIENT MESSAGE (OUTPATIENT)
Dept: FAMILY MEDICINE CLINIC | Facility: CLINIC | Age: 39
End: 2019-04-09

## 2019-04-09 RX ORDER — HYDROCODONE BITARTRATE AND ACETAMINOPHEN 5; 325 MG/1; MG/1
2 TABLET ORAL EVERY 8 HOURS PRN
Qty: 180 TABLET | Refills: 0 | Status: SHIPPED | OUTPATIENT
Start: 2019-04-09 | End: 2019-05-14

## 2019-04-09 NOTE — TELEPHONE ENCOUNTER
Future appt:     Your appointments     Date & Time Appointment Department [facility])    May 14, 2019 10:30 AM CDT Follow up - Extended with Nicole Matthews MD [address] [place] [place] [address])            Jasvir Apple Me

## 2019-04-09 NOTE — TELEPHONE ENCOUNTER
From: Leonor Hebert  To:  Raffaele Leon MD  Sent: 4/9/2019 1:43 PM CDT  Subject: Other    refill [place] mail to home address

## 2019-05-01 DIAGNOSIS — Z90.10 STEWART-TREVES SYNDROME (HCC): Primary | ICD-10-CM

## 2019-05-01 DIAGNOSIS — C50.919 STEWART-TREVES SYNDROME (HCC): Primary | ICD-10-CM

## 2019-05-01 RX ORDER — ONDANSETRON 4 MG/1
TABLET, FILM COATED ORAL
Qty: 20 TABLET | Refills: 0 | Status: SHIPPED | OUTPATIENT
Start: 2019-05-01

## 2019-05-01 NOTE — TELEPHONE ENCOUNTER
Future appt:     Your appointments     Date & Time Appointment Department [facility])    May 14, 2019 10:30 AM CDT Follow up - Extended with Amita Allen MD [address] [place] [address], [place] ([facility])            Cesar Landeros

## 2019-05-14 ENCOUNTER — LAB ENCOUNTER (OUTPATIENT)
Dept: LAB | Age: 39
End: 2019-05-14
Attending: FAMILY MEDICINE
Payer: COMMERCIAL

## 2019-05-14 ENCOUNTER — OFFICE VISIT (OUTPATIENT)
Dept: FAMILY MEDICINE CLINIC | Facility: CLINIC | Age: 39
End: 2019-05-14
Payer: COMMERCIAL

## 2019-05-14 VITALS
SYSTOLIC BLOOD PRESSURE: 120 MMHG | RESPIRATION RATE: 16 BRPM | TEMPERATURE: 97 F | HEART RATE: 96 BPM | HEIGHT: 61.5 IN | WEIGHT: 147.25 LBS | DIASTOLIC BLOOD PRESSURE: 76 MMHG | BODY MASS INDEX: 27.44 KG/M2

## 2019-05-14 DIAGNOSIS — C50.919 STEWART-TREVES SYNDROME (HCC): ICD-10-CM

## 2019-05-14 DIAGNOSIS — Z90.10 STEWART-TREVES SYNDROME (HCC): ICD-10-CM

## 2019-05-14 DIAGNOSIS — G89.4 CHRONIC PAIN SYNDROME: Primary | ICD-10-CM

## 2019-05-14 DIAGNOSIS — M54.41 ACUTE MIDLINE LOW BACK PAIN WITH RIGHT-SIDED SCIATICA: ICD-10-CM

## 2019-05-14 DIAGNOSIS — D68.0 TYPE 1 VON WILLEBRAND DISEASE (HCC): ICD-10-CM

## 2019-05-14 PROBLEM — D68.00 VON WILLEBRAND DISEASE (HCC): Status: RESOLVED | Noted: 2017-02-08 | Resolved: 2019-05-14

## 2019-05-14 PROBLEM — D68.00 VON WILLEBRAND DISEASE: Status: RESOLVED | Noted: 2017-02-08 | Resolved: 2019-05-14

## 2019-05-14 PROCEDURE — 86300 IMMUNOASSAY TUMOR CA 15-3: CPT

## 2019-05-14 PROCEDURE — 80053 COMPREHEN METABOLIC PANEL: CPT

## 2019-05-14 PROCEDURE — 36415 COLL VENOUS BLD VENIPUNCTURE: CPT

## 2019-05-14 PROCEDURE — 85025 COMPLETE CBC W/AUTO DIFF WBC: CPT

## 2019-05-14 PROCEDURE — 99214 OFFICE O/P EST MOD 30 MIN: CPT | Performed by: FAMILY MEDICINE

## 2019-05-14 RX ORDER — HYDROCODONE BITARTRATE AND ACETAMINOPHEN 7.5; 325 MG/1; MG/1
1 TABLET ORAL EVERY 6 HOURS PRN
Qty: 120 TABLET | Refills: 0 | Status: SHIPPED | OUTPATIENT
Start: 2019-05-14 | End: 2019-06-10

## 2019-05-14 RX ORDER — IBUPROFEN 600 MG/1
600 TABLET ORAL EVERY 8 HOURS PRN
Qty: 60 TABLET | Refills: 3 | Status: SHIPPED | OUTPATIENT
Start: 2019-05-14

## 2019-05-14 RX ORDER — GRAPE SEED EXT/BIOFLAV,CITRUS 50MG-250MG
1 CAPSULE ORAL DAILY
COMMUNITY

## 2019-05-14 NOTE — PROGRESS NOTES
[place] [facility] [place]  PROGRESS NOTE  Chief Complaint:   Patient presents with:  Medication Follow-Up: Wants labs drawn from /we have orders? HPI:   This is a 41year old female coming in for follow-up.   She said she feels much anthony Absolute 0.16 0.00 - 0.30 x10(3) uL    Basophil Absolute 0.06 0.00 - 0.10 x10(3) uL    Immature Granulocyte Absolute 0.02 0.00 - 1.00 x10(3) uL    Neutrophil % 60.6 %    Lymphocyte % 27.7 %    Monocyte % 9.2 %    Eosinophil % 1.7 %    Basophil % 0.6 %    I OTHER (SEE COMMENTS)    Comment:Other reaction(s): foggy thinking, poor memory             Causes confusion  Progesterone            HIVES    Comment:Hives/Itching  Adhesive Tape           RASH    Comment:Microfoam tape  Current Meds:    Florrie Costa yellow sclerae. Ears, Nose, Throat:  Denies hearing loss, sneezing, congestion, runny nose or sore throat. INTEGUMENTARY:  Denies rashes, itching, skin lesion, or excessive skin dryness.   CARDIOVASCULAR:  Denies chest pain, chest pressure, chest discomfor rashes, no skin lesion, no bruising, good turgor. HEART:  Regular rate and rhythm, no murmurs, rubs or gallops. LUNGS: Clear to auscultation bilterally, no rales/rhonchi/wheezing.   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

## 2019-06-10 ENCOUNTER — PATIENT MESSAGE (OUTPATIENT)
Dept: FAMILY MEDICINE CLINIC | Facility: CLINIC | Age: 39
End: 2019-06-10

## 2019-06-11 RX ORDER — HYDROCODONE BITARTRATE AND ACETAMINOPHEN 7.5; 325 MG/1; MG/1
1 TABLET ORAL EVERY 6 HOURS PRN
Qty: 120 TABLET | Refills: 0 | Status: SHIPPED | OUTPATIENT
Start: 2019-06-11 | End: 2019-07-08

## 2019-07-08 ENCOUNTER — PATIENT MESSAGE (OUTPATIENT)
Dept: FAMILY MEDICINE CLINIC | Facility: CLINIC | Age: 39
End: 2019-07-08

## 2019-07-08 RX ORDER — HYDROCODONE BITARTRATE AND ACETAMINOPHEN 7.5; 325 MG/1; MG/1
1 TABLET ORAL EVERY 6 HOURS PRN
Qty: 120 TABLET | Refills: 0 | Status: SHIPPED | OUTPATIENT
Start: 2019-07-08 | End: 2019-08-02

## 2019-07-30 ENCOUNTER — OFFICE VISIT (OUTPATIENT)
Dept: FAMILY MEDICINE CLINIC | Facility: CLINIC | Age: 39
End: 2019-07-30

## 2019-07-30 VITALS
RESPIRATION RATE: 16 BRPM | OXYGEN SATURATION: 98 % | HEART RATE: 106 BPM | WEIGHT: 137.81 LBS | HEIGHT: 61.5 IN | SYSTOLIC BLOOD PRESSURE: 118 MMHG | TEMPERATURE: 98 F | BODY MASS INDEX: 25.69 KG/M2 | DIASTOLIC BLOOD PRESSURE: 68 MMHG

## 2019-07-30 DIAGNOSIS — J40 BRONCHITIS: Primary | ICD-10-CM

## 2019-07-30 PROCEDURE — 99213 OFFICE O/P EST LOW 20 MIN: CPT | Performed by: FAMILY MEDICINE

## 2019-07-30 RX ORDER — ALBUTEROL SULFATE 90 UG/1
2 AEROSOL, METERED RESPIRATORY (INHALATION) EVERY 4 HOURS PRN
Qty: 1 INHALER | Refills: 6 | Status: SHIPPED | OUTPATIENT
Start: 2019-07-30 | End: 2020-10-27

## 2019-07-30 RX ORDER — MONTELUKAST SODIUM 10 MG/1
10 TABLET ORAL DAILY
Qty: 30 TABLET | Refills: 3 | Status: SHIPPED | OUTPATIENT
Start: 2019-07-30 | End: 2020-12-11

## 2019-07-30 NOTE — PROGRESS NOTES
[facility] [place]  PROGRESS NOTE  Chief Complaint:   Patient presents with:  Cough      HPI:   This is a 41year old female coming in for a cough for the last several days. She has felt worn out and exhausted.   She said she slept all day yeste - 1.00 x10(3) uL    Eosinophil Absolute 0.14 0.00 - 0.70 x10(3) uL    Basophil Absolute 0.03 0.00 - 0.20 x10(3) uL    Immature Granulocyte Absolute 0.02 0.00 - 1.00 x10(3) uL    Neutrophil % 53.4 %    Lymphocyte % 37.1 %    Monocyte % 7.0 %    Eosinophil % Comment:Other reaction(s): hives  Topiramate              OTHER (SEE COMMENTS)    Comment:Other reaction(s): foggy thinking, poor memory             Causes confusion  Progesterone            HIVES    Comment:Hives/Itching  Adhesive Tape           RASH    C capsule by mouth daily. Disp:  Rfl:    cetirizine (ZYRTEC ALLERGY) 10 MG Oral Tab Take 1 tablet by mouth daily. Disp:  Rfl:       Counseling given: Not Answered       REVIEW OF SYSTEMS:   CONSTITUTIONAL: She feels rotten. She has no energy.   EENT:  Eyes: Eyes: EOMI, PERRLA, no scleral icterus, conjunctivae clear bilaterally, no eye discharge Ears: External normal. Nose: patent, no nasal discharge Throat:  No tonsillar erythema or exudate. Mouth:  No oral lesions or ulcerations, good dentition.   NECK: Supp cancer     Encounter for routine gynecological examination     Age related female infertility     Malignant neoplasm of breast (female) [facility])     S/P bilateral mastectomy     Ovarian retention cyst     Pulmonary nodule     Need for prophylactic vaccination

## 2019-08-02 ENCOUNTER — PATIENT MESSAGE (OUTPATIENT)
Dept: FAMILY MEDICINE CLINIC | Facility: CLINIC | Age: 39
End: 2019-08-02

## 2019-08-02 RX ORDER — HYDROCODONE BITARTRATE AND ACETAMINOPHEN 7.5; 325 MG/1; MG/1
1 TABLET ORAL EVERY 6 HOURS PRN
Qty: 120 TABLET | Refills: 0 | Status: SHIPPED | OUTPATIENT
Start: 2019-08-02 | End: 2019-09-03

## 2019-08-02 NOTE — TELEPHONE ENCOUNTER
From: Cyrus Aviles  To: Ana Devries MD  Sent: 8/2/2019 9:09 AM CDT  Subject: Prescription Question    Heading to [place] will be out norco can I get a early refill?  It would be 4 days early

## 2019-08-23 ENCOUNTER — TELEPHONE (OUTPATIENT)
Dept: FAMILY MEDICINE CLINIC | Facility: CLINIC | Age: 39
End: 2019-08-23

## 2019-08-23 NOTE — TELEPHONE ENCOUNTER
Please do not ask for a refill from the dentist or the oral surgeon. We will continue to provide the prescription for the Norco.  When a refill is needed let us know but try to use the absolute minimum needed.

## 2019-08-23 NOTE — TELEPHONE ENCOUNTER
Patient states she broke a tooth in half and went to dentist today. Was given Clindamycin 300mg TID [id number] along with another Norco 7.5mg Rx  1-2 tabs every 6 hours/prn  #10. Asking how she can go about taking   [place] Rx?   If she takes 2 from

## 2019-09-03 ENCOUNTER — TELEPHONE (OUTPATIENT)
Dept: FAMILY MEDICINE CLINIC | Facility: CLINIC | Age: 39
End: 2019-09-03

## 2019-09-03 ENCOUNTER — OFFICE VISIT (OUTPATIENT)
Dept: FAMILY MEDICINE CLINIC | Facility: CLINIC | Age: 39
End: 2019-09-03

## 2019-09-03 VITALS
HEART RATE: 98 BPM | HEIGHT: 61.5 IN | DIASTOLIC BLOOD PRESSURE: 72 MMHG | BODY MASS INDEX: 26.84 KG/M2 | SYSTOLIC BLOOD PRESSURE: 110 MMHG | WEIGHT: 144 LBS | TEMPERATURE: 98 F | RESPIRATION RATE: 16 BRPM | OXYGEN SATURATION: 98 %

## 2019-09-03 DIAGNOSIS — L23.1 ALLERGIC CONTACT DERMATITIS DUE TO ADHESIVES: ICD-10-CM

## 2019-09-03 DIAGNOSIS — C50.919 MALIGNANT NEOPLASM OF BREAST WITH BRCA1 GENE MUTATION, UNSPECIFIED LATERALITY (HCC): ICD-10-CM

## 2019-09-03 DIAGNOSIS — G89.4 CHRONIC PAIN SYNDROME: ICD-10-CM

## 2019-09-03 DIAGNOSIS — J40 BRONCHITIS: Primary | ICD-10-CM

## 2019-09-03 DIAGNOSIS — Z15.01 MALIGNANT NEOPLASM OF BREAST WITH BRCA1 GENE MUTATION, UNSPECIFIED LATERALITY (HCC): ICD-10-CM

## 2019-09-03 PROBLEM — M54.41 ACUTE RIGHT-SIDED LOW BACK PAIN WITH RIGHT-SIDED SCIATICA: Status: RESOLVED | Noted: 2018-09-22 | Resolved: 2019-09-03

## 2019-09-03 PROBLEM — M54.41 ACUTE MIDLINE LOW BACK PAIN WITH RIGHT-SIDED SCIATICA: Status: RESOLVED | Noted: 2019-01-10 | Resolved: 2019-09-03

## 2019-09-03 PROCEDURE — 99213 OFFICE O/P EST LOW 20 MIN: CPT | Performed by: FAMILY MEDICINE

## 2019-09-03 RX ORDER — CYCLOBENZAPRINE HCL 10 MG
10 TABLET ORAL 3 TIMES DAILY PRN
Qty: 30 TABLET | Refills: 3 | Status: SHIPPED | OUTPATIENT
Start: 2019-09-03 | End: 2020-02-14

## 2019-09-03 RX ORDER — POTASSIUM CHLORIDE 750 MG/1
10 TABLET, FILM COATED, EXTENDED RELEASE ORAL 2 TIMES DAILY
Qty: 180 TABLET | Refills: 3 | Status: SHIPPED | OUTPATIENT
Start: 2019-09-03 | End: 2020-11-27

## 2019-09-03 RX ORDER — FUROSEMIDE 40 MG/1
TABLET ORAL
Qty: 90 TABLET | Refills: 1 | Status: SHIPPED | OUTPATIENT
Start: 2019-09-03 | End: 2020-02-26

## 2019-09-03 RX ORDER — HYDROCODONE BITARTRATE AND ACETAMINOPHEN 5; 325 MG/1; MG/1
1 TABLET ORAL 2 TIMES DAILY
Qty: 60 TABLET | Refills: 0 | Status: SHIPPED | OUTPATIENT
Start: 2019-09-03 | End: 2019-09-30

## 2019-09-03 NOTE — TELEPHONE ENCOUNTER
refill needed on her [place], pharmacy states to early, change in medication? Use what she has been given of the higher strength?

## 2019-09-03 NOTE — TELEPHONE ENCOUNTER
Phoned Jordy-  This was taken care of earlier. Explained this new Norco Rx is a dose reduction. They have filled the prescription.   LES [facility], 09/03/19, 3:44 PM

## 2019-09-03 NOTE — PROGRESS NOTES
[address]  PROGRESS NOTE  Chief Complaint:   Patient presents with: Follow - Up      HPI:   This is a 41year old female coming in for follow-up on her medication. She said that her bronchitis has now completely resolved.   She no long .0 150.0 - 450.0 10(3)uL    MCV 91.4 80.0 - 100.0 fL    MCH 31.0 26.0 - 34.0 pg    MCHC 33.9 31.0 - 37.0 g/dL    RDW 13.0 11.0 - 15.0 %    RDW-SD 43.8 35.1 - 46.3 fL    Neutrophil Absolute Prelim 4.07 1.50 - 7.70 x10 (3) uL    Neutrophil Absolute HIVES  Penicillins             HIVES    Comment:Other reaction(s): Hives/Urticaria  Progestins              HIVES  Pseudoephedrine         PALPITATIONS  Azithromycin            OTHER (SEE COMMENTS)    Comment:Other reaction(s):  Other (see Comments) Tab Take 1 tablet (325 mg total) by mouth daily. Disp: 90 tablet Rfl: 3   estradiol 1 MG Oral Tab Take 1 mg by mouth daily. Disp:  Rfl:    docusate sodium 100 MG Oral Cap Take 100 mg by mouth daily.  Disp:  Rfl:    Biotin 55717 MCG Oral Tablet Dispersible T 98 °F (36.7 °C) (Tympanic)   Resp 16   Ht 61.5\"   Wt 144 lb   LMP 12/07/2016 (Approximate)   SpO2 98%   BMI 26.77 kg/m²  Estimated body mass index is 26.77 kg/m² as calculated from the following:    Height as of this encounter: 61.5\".     Weight as of thi Signed Prescriptions Disp Refills   • furosemide 40 MG Oral Tab 90 tablet 1     Sig: TAKE 1 TABLET(40 MG) BY MOUTH DAILY   • Potassium Chloride ER (KLOR-CON 10) 10 MEQ Oral Tab  tablet 3     Sig: Take 1 tablet (10 mEq total) by mouth 2 (two) time reconstruction, bilateral     Symmastia     Allergic contact dermatitis due to adhesives     Yeast vaginitis     right S1 radiculopathy     Acute non intractable tension-type headache     L5-S1 mild central & left, L4-5 slight bulging discs     Bronchitis

## 2019-09-04 ENCOUNTER — TELEPHONE (OUTPATIENT)
Dept: FAMILY MEDICINE CLINIC | Facility: CLINIC | Age: 39
End: 2019-09-04

## 2019-09-04 DIAGNOSIS — J40 BRONCHITIS: Primary | ICD-10-CM

## 2019-09-04 DIAGNOSIS — R91.1 PULMONARY NODULE: ICD-10-CM

## 2019-09-04 NOTE — TELEPHONE ENCOUNTER
would like Dr Martha Mccarthy to call her back directly   RE:   she had appt with DR Dior Sal this am and would like to discuss his findings

## 2019-09-04 NOTE — TELEPHONE ENCOUNTER
I called and spoke with [facility]. She said she met with Dr. Kristyn Stevenson yesterday and that he was concerned about the findings on her PET scan. She said that he told her that this was not what you would normally see after bronchitis.   Dr. Kristyn Stevenson recommended

## 2019-09-30 ENCOUNTER — PATIENT MESSAGE (OUTPATIENT)
Dept: FAMILY MEDICINE CLINIC | Facility: CLINIC | Age: 39
End: 2019-09-30

## 2019-09-30 RX ORDER — HYDROCODONE BITARTRATE AND ACETAMINOPHEN 5; 325 MG/1; MG/1
1 TABLET ORAL 2 TIMES DAILY
Qty: 60 TABLET | Refills: 0 | Status: SHIPPED | OUTPATIENT
Start: 2019-09-30 | End: 2019-11-08

## 2019-09-30 NOTE — TELEPHONE ENCOUNTER
Future appt:    Last Appointment:  9/3/2019  No results found for: CHOLEST, HDL, LDL, TRIGLY, TRIG  No results found for: EAG, A1C  No results found for: T4F, TSH, TSHT4    No follow-ups on file.

## 2019-09-30 NOTE — TELEPHONE ENCOUNTER
From: Cyrus Aviles  To:  Ana Devries MD  Sent: 9/30/2019 12:28 PM CDT  Subject: Prescription Question    refill norco will

## 2019-10-22 ENCOUNTER — OFFICE VISIT (OUTPATIENT)
Dept: FAMILY MEDICINE CLINIC | Facility: CLINIC | Age: 39
End: 2019-10-22
Payer: MEDICAID

## 2019-10-22 VITALS
WEIGHT: 140.38 LBS | BODY MASS INDEX: 26.17 KG/M2 | HEART RATE: 84 BPM | RESPIRATION RATE: 16 BRPM | DIASTOLIC BLOOD PRESSURE: 66 MMHG | OXYGEN SATURATION: 98 % | HEIGHT: 61.5 IN | TEMPERATURE: 98 F | SYSTOLIC BLOOD PRESSURE: 110 MMHG

## 2019-10-22 DIAGNOSIS — M54.42 ACUTE LEFT-SIDED LOW BACK PAIN WITH LEFT-SIDED SCIATICA: Primary | ICD-10-CM

## 2019-10-22 PROCEDURE — 99214 OFFICE O/P EST MOD 30 MIN: CPT | Performed by: NURSE PRACTITIONER

## 2019-10-22 RX ORDER — IBUPROFEN 800 MG/1
800 TABLET ORAL EVERY 8 HOURS PRN
Qty: 30 TABLET | Refills: 3 | Status: SHIPPED | OUTPATIENT
Start: 2019-10-22 | End: 2021-04-01

## 2019-10-22 RX ORDER — HYDROCODONE BITARTRATE AND ACETAMINOPHEN 7.5; 325 MG/1; MG/1
1 TABLET ORAL EVERY 4 HOURS PRN
Qty: 30 TABLET | Refills: 0 | Status: SHIPPED | OUTPATIENT
Start: 2019-10-22 | End: 2019-10-28

## 2019-10-22 RX ORDER — METHYLPREDNISOLONE 4 MG/1
TABLET ORAL
Qty: 1 KIT | Refills: 0 | Status: SHIPPED | OUTPATIENT
Start: 2019-10-22 | End: 2019-10-28 | Stop reason: ALTCHOICE

## 2019-10-22 NOTE — PROGRESS NOTES
Nona Pickett is a 41year old female.   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      HPI:   Complaints of back pain - moved hay- through back out - on Sunday (10/20)- pain to left buttock into leg - 3  HYDROcodone-acetaminophen 7.5-325 MG Oral Tab, Take 1 tablet by mouth every 4 (four) hours as needed for Pain., Disp: 30 tablet, Rfl: 0  furosemide 40 MG Oral Tab, TAKE 1 TABLET(40 MG) BY MOUTH DAILY, Disp: 90 tablet, Rfl: 1  Potassium Chloride ER (KLOR rhinitis    • Cancer [facility])    • Chronic pain syndrome 6/22/2016   • Edema 11/7/2015   • Esophageal reflux    • 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

## 2019-10-25 ENCOUNTER — TELEPHONE (OUTPATIENT)
Dept: FAMILY MEDICINE CLINIC | Facility: CLINIC | Age: 39
End: 2019-10-25

## 2019-10-25 NOTE — TELEPHONE ENCOUNTER
Please take as little Norco as possible. Please do not take it any more often than 4 tablets a day. We will talk about this further at the appointment on Monday October 28.
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
## 2019-10-28 ENCOUNTER — OFFICE VISIT (OUTPATIENT)
Dept: FAMILY MEDICINE CLINIC | Facility: CLINIC | Age: 39
End: 2019-10-28
Payer: COMMERCIAL

## 2019-10-28 VITALS
TEMPERATURE: 99 F | DIASTOLIC BLOOD PRESSURE: 72 MMHG | HEIGHT: 62 IN | RESPIRATION RATE: 16 BRPM | HEART RATE: 100 BPM | WEIGHT: 139.63 LBS | BODY MASS INDEX: 25.7 KG/M2 | SYSTOLIC BLOOD PRESSURE: 122 MMHG
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
## 2019-10-28 DIAGNOSIS — D68.0 TYPE 1 VON WILLEBRAND DISEASE (HCC): ICD-10-CM

## 2019-10-28 DIAGNOSIS — S70.02XD CONTUSION OF LEFT HIP, SUBSEQUENT ENCOUNTER: ICD-10-CM

## 2019-10-28 DIAGNOSIS — M51.9 LUMBAR DISC DISEASE: ICD-10-CM

## 2019-10-28 PROBLEM — S70.02XA CONTUSION OF LEFT HIP: Status: ACTIVE | Noted: 2019-10-28
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
## 2019-10-28 PROCEDURE — 99214 OFFICE O/P EST MOD 30 MIN: CPT | Performed by: FAMILY MEDICINE

## 2019-10-28 RX ORDER — HYDROCODONE BITARTRATE AND ACETAMINOPHEN 7.5; 325 MG/1; MG/1
1 TABLET ORAL EVERY 6 HOURS PRN
Qty: 30 TABLET | Refills: 0 | Status: SHIPPED | OUTPATIENT
Start: 2019-10-28 | End: 2019-11-19 | Stop reason: ALTCHOICE

## 2019-10-28 RX ORDER — KETOROLAC TROMETHAMINE 10 MG/1
10 TABLET, FILM COATED ORAL 3 TIMES DAILY
Qty: 10 TABLET | Refills: 0 | Status: SHIPPED | OUTPATIENT
Start: 2019-10-28 | End: 2020-01-09
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
## 2019-11-08 ENCOUNTER — PATIENT MESSAGE (OUTPATIENT)
Dept: FAMILY MEDICINE CLINIC | Facility: CLINIC | Age: 39
End: 2019-11-08

## 2019-11-08 RX ORDER — HYDROCODONE BITARTRATE AND ACETAMINOPHEN 5; 325 MG/1; MG/1
1 TABLET ORAL 2 TIMES DAILY
Qty: 60 TABLET | Refills: 0 | Status: SHIPPED | OUTPATIENT
Start: 2019-11-08 | End: 2019-12-04

## 2019-11-08 NOTE — TELEPHONE ENCOUNTER
From: Darek Patiño  To: Corina Griffiths MD  Sent: 11/8/2019 10:20 AM CST  Subject: Prescription Question    refill norco 5mg

## 2019-11-08 NOTE — TELEPHONE ENCOUNTER
Future appt:     Your appointments     Date & Time Appointment Department [facility])    Nov 19, 2019 11:30 AM CST Liability with Helen Dexter MD [address] [facility], [facility] ([facility])        Dec 17, 2019  9:30 AM CS

## 2019-11-19 ENCOUNTER — OFFICE VISIT (OUTPATIENT)
Dept: FAMILY MEDICINE CLINIC | Facility: CLINIC | Age: 39
End: 2019-11-19
Payer: COMMERCIAL

## 2019-11-19 VITALS
WEIGHT: 137.38 LBS | DIASTOLIC BLOOD PRESSURE: 60 MMHG | HEART RATE: 112 BPM | TEMPERATURE: 98 F | BODY MASS INDEX: 25.28 KG/M2 | RESPIRATION RATE: 16 BRPM | HEIGHT: 62 IN | SYSTOLIC BLOOD PRESSURE: 112 MMHG

## 2019-11-19 DIAGNOSIS — M51.9 LUMBAR DISC DISEASE: Primary | ICD-10-CM

## 2019-11-19 DIAGNOSIS — M54.16 LUMBAR RADICULOPATHY: ICD-10-CM

## 2019-11-19 DIAGNOSIS — G89.4 CHRONIC PAIN SYNDROME: ICD-10-CM

## 2019-11-19 DIAGNOSIS — S70.02XD CONTUSION OF LEFT HIP, SUBSEQUENT ENCOUNTER: ICD-10-CM

## 2019-11-19 PROCEDURE — 99214 OFFICE O/P EST MOD 30 MIN: CPT | Performed by: FAMILY MEDICINE

## 2019-11-19 RX ORDER — MULTIVIT WITH MINERALS/LUTEIN
1 TABLET ORAL DAILY
COMMUNITY
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
## 2019-11-26 ENCOUNTER — TELEPHONE (OUTPATIENT)
Dept: FAMILY MEDICINE CLINIC | Facility: CLINIC | Age: 39
End: 2019-11-26

## 2019-11-26 NOTE — TELEPHONE ENCOUNTER
Per Spenser Limon-  Above was automated call. Apparently there is paperwork to follow. Automated call does not states  What particular service was approved and what was not approved.   Vel Aguilar, 11/26/19, 1:21 PM

## 2019-12-04 ENCOUNTER — PATIENT MESSAGE (OUTPATIENT)
Dept: FAMILY MEDICINE CLINIC | Facility: CLINIC | Age: 39
End: 2019-12-04

## 2019-12-04 RX ORDER — HYDROCODONE BITARTRATE AND ACETAMINOPHEN 5; 325 MG/1; MG/1
1 TABLET ORAL 2 TIMES DAILY
Qty: 60 TABLET | Refills: 0 | Status: SHIPPED | OUTPATIENT
Start: 2019-12-04 | End: 2020-01-03

## 2019-12-04 NOTE — TELEPHONE ENCOUNTER
-  This is a long term patient of 's on long term Norco.    Please advise refill. Alvin López, 12/04/19, 1:52 PM    Future appt:     Your appointments     Date & Time Appointment Department [facility])    Dec 17, 2019  9:30 AM CST Follow U

## 2019-12-04 NOTE — TELEPHONE ENCOUNTER
From: Blair Infante  To: Justen Hurley MD  Sent: 12/4/2019 12:33 PM CST  Subject: Other    refill norco to evonne xiong

## 2020-01-01 ENCOUNTER — PATIENT MESSAGE (OUTPATIENT)
Dept: FAMILY MEDICINE CLINIC | Facility: CLINIC | Age: 40
End: 2020-01-01

## 2020-01-02 NOTE — TELEPHONE ENCOUNTER
Future appt:     Your appointments     Date & Time Appointment Department [facility])    Feb 19, 2020  9:00 AM CST Follow Up Visit with Jay Farias MD [address], [place] ([facility])            [address] [place] [address]

## 2020-01-02 NOTE — TELEPHONE ENCOUNTER
----- Message from Agapito Alarcon sent at 1/1/2020  7:52 PM CST -----  Regarding: Other  Contact: [phone]  Refill [place] [facility]

## 2020-01-03 RX ORDER — HYDROCODONE BITARTRATE AND ACETAMINOPHEN 5; 325 MG/1; MG/1
1 TABLET ORAL 2 TIMES DAILY
Qty: 60 TABLET | Refills: 0 | Status: SHIPPED | OUTPATIENT
Start: 2020-01-03 | End: 2020-01-29

## 2020-01-03 NOTE — TELEPHONE ENCOUNTER
From: Joanne Arroyo  To: Keke Strong MD  Sent: 1/1/2020 7:52 PM CST  Subject: Other    Refill [place] [facility]

## 2020-01-07 ENCOUNTER — TELEPHONE (OUTPATIENT)
Dept: FAMILY MEDICINE CLINIC | Facility: CLINIC | Age: 40
End: 2020-01-07

## 2020-01-07 NOTE — TELEPHONE ENCOUNTER
authorization has been approved for 30 days - will  on , written notice has been sent to patient and to
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
## 2020-01-09 NOTE — PATIENT INSTRUCTIONS
Do not take cyclobenzaprine (muscle relaxant) with Norco/hydrocodone (pain pill) or xanax (anxiety med) together. Do not to take any of these medications with alcohol.      Start Sertraline (Zoloft ) today 25 mg once a day for 7 days, then increase to 5 in nature, going to a movie, etc.). · Take care of your physical body. Eat a balanced diet (low in saturated fat and high in fruits and vegetables) and establish an exercise plan at least 3 times a week for 30 minutes.  Even mild-moderate exercise (like br [address]. All rights reserved. This information is not intended as a substitute for professional medical care. Always follow your healthcare professional's instructions.

## 2020-01-13 ENCOUNTER — TELEPHONE (OUTPATIENT)
Dept: FAMILY MEDICINE CLINIC | Facility: CLINIC | Age: 40
End: 2020-01-13

## 2020-01-13 RX ORDER — SERTRALINE HYDROCHLORIDE 25 MG/1
TABLET, FILM COATED ORAL
Qty: 7 TABLET | Refills: 0 | OUTPATIENT
Start: 2020-01-13

## 2020-01-13 NOTE — TELEPHONE ENCOUNTER
Received e-scribe refill request for sertraline 25 mg tablets from [facility]. 25 mg dose was to be taken for only 7 days then patient will be switched to 50 mg daily. See OV note instructions from 1/9/2020 with Darlin Pérez. Refill denied.

## 2020-01-28 ENCOUNTER — PATIENT MESSAGE (OUTPATIENT)
Dept: FAMILY MEDICINE CLINIC | Facility: CLINIC | Age: 40
End: 2020-01-28

## 2020-01-29 RX ORDER — HYDROCODONE BITARTRATE AND ACETAMINOPHEN 5; 325 MG/1; MG/1
1 TABLET ORAL 2 TIMES DAILY
Qty: 60 TABLET | Refills: 0 | Status: SHIPPED | OUTPATIENT
Start: 2020-01-29 | End: 2020-02-25

## 2020-01-29 NOTE — TELEPHONE ENCOUNTER
From: Gallo Sweet  To: Tiffanie Abreu MD  Sent: 1/28/2020 10:48 PM CST  Subject: Other    med refill norco to bouchra xiong

## 2020-01-29 NOTE — TELEPHONE ENCOUNTER
Future appt:     Your appointments     Date & Time Appointment Department [facility])    Feb 19, 2020  9:00 AM CST Follow Up Visit with Soto Malagon MD [address], Javier Schmitt ([facility])            [address] [place] [address]

## 2020-02-14 ENCOUNTER — OFFICE VISIT (OUTPATIENT)
Dept: FAMILY MEDICINE CLINIC | Facility: CLINIC | Age: 40
End: 2020-02-14
Payer: MEDICAID

## 2020-02-14 VITALS
HEIGHT: 62 IN | RESPIRATION RATE: 16 BRPM | DIASTOLIC BLOOD PRESSURE: 70 MMHG | HEART RATE: 100 BPM | BODY MASS INDEX: 24.18 KG/M2 | TEMPERATURE: 98 F | SYSTOLIC BLOOD PRESSURE: 118 MMHG | WEIGHT: 131.38 LBS

## 2020-02-14 DIAGNOSIS — F32.0 CURRENT MILD EPISODE OF MAJOR DEPRESSIVE DISORDER WITHOUT PRIOR EPISODE (HCC): ICD-10-CM

## 2020-02-14 DIAGNOSIS — M54.16 LUMBAR RADICULOPATHY: ICD-10-CM

## 2020-02-14 DIAGNOSIS — G89.4 CHRONIC PAIN SYNDROME: Primary | ICD-10-CM

## 2020-02-14 PROCEDURE — 99214 OFFICE O/P EST MOD 30 MIN: CPT | Performed by: FAMILY MEDICINE

## 2020-02-14 RX ORDER — CYCLOBENZAPRINE HCL 10 MG
10 TABLET ORAL 3 TIMES DAILY PRN
Qty: 30 TABLET | Refills: 3 | Status: SHIPPED | OUTPATIENT
Start: 2020-02-14 | End: 2020-09-11

## 2020-02-14 NOTE — PROGRESS NOTES
[facility]  PROGRESS NOTE  Chief Complaint:   Patient presents with:  Back Pain      HPI:   This is a 42year old female coming in for back pain. Her fiancé passed away suddenly from an acute myocardial infarction a month ago.   She has RBC 4.20 3.80 - 5.30 x10(6)uL    HGB 13.0 12.0 - 16.0 g/dL    HCT 38.4 35.0 - 48.0 %    .0 150.0 - 450.0 10(3)uL    MCV 91.4 80.0 - 100.0 fL    MCH 31.0 26.0 - 34.0 pg    MCHC 33.9 31.0 - 37.0 g/dL    RDW 13.0 11.0 - 15.0 %    RDW-SD 43.8 35.1 - Onset   • Cancer Mother      Allergies:    Latex                   RASH, HIVES  Penicillins             HIVES    Comment:Other reaction(s): Hives/Urticaria  Progestins              HIVES  Pseudoephedrine         PALPITATIONS  Azithromycin            OTHER 540 MG Oral Cap Take 1 tablet by mouth daily. • ibuprofen 600 MG Oral Tab Take 1 tablet (600 mg total) by mouth every 8 (eight) hours as needed for Pain.  60 tablet 3   • Ondansetron HCl (ZOFRAN) 4 mg tablet TAKE 1 TABLET(4 MG) BY MOUTH EVERY 8 HOURS AS grieving and has a great deal of sadness now. She said the anxiety was severe for the first week or so after his death but is improving now. ENDOCRINOLOGIC:  Denies excessive sweating, cold or heat intolerance, polyuria or polydipsia.   ALLERGIES:  Denies week.      Meds & Refills for this Visit:  Requested Prescriptions     Signed Prescriptions Disp Refills   • cyclobenzaprine 10 MG Oral Tab 30 tablet 3     Sig: Take 1 tablet (10 mg total) by mouth 3 (three) times daily as needed.        Health Maintenance: major depressive disorder without prior episode ([facility]      Coni Molina MD  2/14/2020  3:31 PM

## 2020-02-19 NOTE — PROGRESS NOTES
[address]  PROGRESS NOTE  Chief Complaint:   Patient presents with: Follow - Up  Back Pain  Depression      HPI:   This is a 42year old female coming in for follow-up on her medication. She is taking sertraline 25 mg daily.   She said 80.0 - 100.0 fL    MCH 31.0 26.0 - 34.0 pg    MCHC 33.9 31.0 - 37.0 g/dL    RDW 13.0 11.0 - 15.0 %    RDW-SD 43.8 35.1 - 46.3 fL    Neutrophil Absolute Prelim 4.07 1.50 - 7.70 x10 (3) uL    Neutrophil Absolute 4.07 1.50 - 7.70 x10(3) uL    Lymphocyte Absol reaction(s): Hives/Urticaria  Progestins              HIVES  Pseudoephedrine         PALPITATIONS  Azithromycin            OTHER (SEE COMMENTS)    Comment:Other reaction(s):  Other (see Comments)             Severe abd pain             Other reaction(s): se (eight) hours as needed for Pain. 60 tablet 3   • Ondansetron HCl (ZOFRAN) 4 mg tablet TAKE 1 TABLET(4 MG) BY MOUTH EVERY 8 HOURS AS NEEDED FOR NAUSEA 20 tablet 0   • Esomeprazole Magnesium (NEXIUM 24HR) 20 MG Oral Tab EC Take 1 tablet by mouth daily. history of asthma, sneezing, hives, eczema or rhinitis. EXAM:   /74   Pulse 111   Temp (!) 96.4 °F (35.8 °C) (Tympanic)   Resp 18   Wt 133 lb (60.3 kg)   LMP 12/07/2016 (Approximate)   SpO2 100%   BMI 24.33 kg/m²  Estimated body mass index is 24. ordered in this encounter       Health Maintenance:  Annual Physical due on 12[birth year]14/1985  Pneumococcal PPSV23/PCV13 Highest Risk Adult(2 of 3 - PCV13) due on 02/16/2018  Influenza Vaccine(1) due on 09/01/2019    Patient/Caregiver Education: Patient/Caregiver

## 2020-02-25 ENCOUNTER — PATIENT MESSAGE (OUTPATIENT)
Dept: FAMILY MEDICINE CLINIC | Facility: CLINIC | Age: 40
End: 2020-02-25

## 2020-02-25 RX ORDER — HYDROCODONE BITARTRATE AND ACETAMINOPHEN 5; 325 MG/1; MG/1
1 TABLET ORAL 2 TIMES DAILY
Qty: 60 TABLET | Refills: 0 | Status: SHIPPED | OUTPATIENT
Start: 2020-02-25 | End: 2020-03-20

## 2020-02-25 NOTE — TELEPHONE ENCOUNTER
Future appt:     Your appointments     Date & Time Appointment Department [facility])    May 19, 2020  9:30 AM CDT Follow Up Visit with Sim Michael MD [address] [place] [address], [facility]

## 2020-02-25 NOTE — TELEPHONE ENCOUNTER
From: Leonor Hebert  To: Kenia Everett MD  Sent: 2/25/2020 12:12 PM CST  Subject: Prescription Question    med refill norco to evonne xiong

## 2020-02-26 RX ORDER — FUROSEMIDE 40 MG/1
TABLET ORAL
Qty: 90 TABLET | Refills: 1 | Status: SHIPPED | OUTPATIENT
Start: 2020-02-26 | End: 2020-11-27

## 2020-02-26 NOTE — TELEPHONE ENCOUNTER
Future appt:     Your appointments     Date & Time Appointment Department [facility])    May 19, 2020  9:30 AM CDT Follow Up Visit with Paul Cornelius MD [address] [facility], [facility] ([facility])            Simfinit

## 2020-03-20 ENCOUNTER — TELEPHONE (OUTPATIENT)
Dept: FAMILY MEDICINE CLINIC | Facility: CLINIC | Age: 40
End: 2020-03-20

## 2020-03-20 ENCOUNTER — PATIENT MESSAGE (OUTPATIENT)
Dept: FAMILY MEDICINE CLINIC | Facility: CLINIC | Age: 40
End: 2020-03-20

## 2020-03-20 RX ORDER — HYDROCODONE BITARTRATE AND ACETAMINOPHEN 5; 325 MG/1; MG/1
1 TABLET ORAL 2 TIMES DAILY
Qty: 60 TABLET | Refills: 0 | Status: SHIPPED | OUTPATIENT
Start: 2020-03-20 | End: 2020-04-17

## 2020-03-20 NOTE — TELEPHONE ENCOUNTER
Future appt:     Your appointments     Date & Time Appointment Department [facility])    May 19, 2020  9:30 AM CDT Follow Up Visit with Ana Devries MD [address] [facility], [facility] ([facility])            Gelacio Alvarenga

## 2020-03-20 NOTE — TELEPHONE ENCOUNTER
From: Leonor Hebert  To: Patience MD Marguerite  Sent: 3/20/2020 10:02 AM CDT  Subject: Non-Urgent Medical Question    i am due for refill of norco next week i have been just informed they are gonna announce this weekend about being house arrest [place]

## 2020-03-30 RX ORDER — ALPRAZOLAM 0.25 MG/1
TABLET ORAL
Qty: 30 TABLET | Refills: 0 | Status: SHIPPED | OUTPATIENT
Start: 2020-03-30

## 2020-03-30 NOTE — TELEPHONE ENCOUNTER
RF for xanax sent - for some reason it printed at work again- can you please have someone in the office sign and fax?    Thanks

## 2020-03-30 NOTE — TELEPHONE ENCOUNTER
Future appt:     Your appointments     Date & Time Appointment Department [facility])    May 19, 2020  9:30 AM CDT Follow Up Visit with Barbie Collado MD [address] [facility], [facility] ([place])            [address] [place] [address]

## 2020-04-17 ENCOUNTER — TELEPHONE (OUTPATIENT)
Dept: FAMILY MEDICINE CLINIC | Facility: CLINIC | Age: 40
End: 2020-04-17

## 2020-04-17 ENCOUNTER — PATIENT MESSAGE (OUTPATIENT)
Dept: FAMILY MEDICINE CLINIC | Facility: CLINIC | Age: 40
End: 2020-04-17

## 2020-04-17 ENCOUNTER — HOSPITAL ENCOUNTER (OUTPATIENT)
Dept: GENERAL RADIOLOGY | Age: 40
Discharge: HOME | End: 2020-04-17
Attending: FAMILY MEDICINE
Payer: COMMERCIAL

## 2020-04-17 ENCOUNTER — OFFICE VISIT (OUTPATIENT)
Dept: FAMILY MEDICINE CLINIC | Facility: CLINIC | Age: 40
End: 2020-04-17
Payer: COMMERCIAL

## 2020-04-17 VITALS
HEART RATE: 80 BPM | TEMPERATURE: 97 F | DIASTOLIC BLOOD PRESSURE: 64 MMHG | RESPIRATION RATE: 16 BRPM | WEIGHT: 131.19 LBS | SYSTOLIC BLOOD PRESSURE: 112 MMHG | HEIGHT: 62 IN | BODY MASS INDEX: 24.14 KG/M2

## 2020-04-17 DIAGNOSIS — M79.672 LEFT FOOT PAIN: Primary | ICD-10-CM

## 2020-04-17 DIAGNOSIS — M79.672 LEFT FOOT PAIN: ICD-10-CM

## 2020-04-17 DIAGNOSIS — S90.32XA CONTUSION OF LEFT FOOT, INITIAL ENCOUNTER: ICD-10-CM

## 2020-04-17 PROCEDURE — 99214 OFFICE O/P EST MOD 30 MIN: CPT | Performed by: FAMILY MEDICINE

## 2020-04-17 PROCEDURE — 73630 X-RAY EXAM OF FOOT: CPT | Performed by: FAMILY MEDICINE

## 2020-04-17 RX ORDER — HYDROCODONE BITARTRATE AND ACETAMINOPHEN 5; 325 MG/1; MG/1
1 TABLET ORAL EVERY 8 HOURS PRN
Qty: 90 TABLET | Refills: 0 | Status: SHIPPED | OUTPATIENT
Start: 2020-04-17 | End: 2020-05-20

## 2020-04-17 NOTE — TELEPHONE ENCOUNTER
Please call the patient, should be checked in office and likely an x-ray. Dr. Shaunna renteria to see patient, please call her and schedule, double check x-ray schedule not sure if that has changed at all.

## 2020-04-17 NOTE — PROGRESS NOTES
Dustin Samayoa is a 42year old female. Patient presents with: Injury: Foot      HPI:     Patient presents for evaluation of acute injury to her left foot. Patient states that horse had new horseshoes applied to hooves yesterday.   Patient was work 2 puffs into the lungs every 4 (four) hours as needed for Wheezing. 1 Inhaler 6   • Montelukast Sodium (SINGULAIR) 10 MG Oral Tab Take 1 tablet (10 mg total) by mouth daily. 30 tablet 3   • Black Cohosh 540 MG Oral Cap Take 1 tablet by mouth daily.      • i Result Value Ref Range    Breast Carcinoma Ag [id number] 20.0 <38.0 u/mL   CANCER ANTIGEN (CA) 15-3   Result Value Ref Range    Cancer Ag 15-3 (CA 15-3) 16.2 <=35.0 U/mL   COMP METABOLIC PANEL (14)   Result Value Ref Range    Glucose 94 70 - 99 mg/dL    Nabil Ortiz denies chest pain  GI: denies abdominal pain and denies heartburn  NEURO: denies headaches    EXAM:   /64 (BP Location: Radial, Patient Position: Sitting, Cuff Size: adult)   Pulse 80   Temp 97.3 °F (36.3 °C) (Tympanic)   Resp 16   Ht 62\"   Wt 131 l

## 2020-04-17 NOTE — TELEPHONE ENCOUNTER
Appt times with  did not work with patient schedule. Per , Schedule with her. Appt given. Ama Ornelas, 04/17/20, 8:50 AM    Future appt:     Your appointments     Date & Time Appointment Department [facility])    Apr 17, 2020 10:30 AM C

## 2020-04-17 NOTE — TELEPHONE ENCOUNTER
From: Leonor Hebert  To: Patigiovanni Everett MD  Sent: 4/17/2020 8:19 AM CDT  Subject: Non-Urgent Medical Question    Maggi Maurice was a bad horse and got shoes on and stepped right on my foot yesterday I have elevated it and put ice on it hurts bad throbbin

## 2020-04-17 NOTE — TELEPHONE ENCOUNTER
From: Teresa Mendoza  To: Aminata Pope MD  Sent: 4/17/2020 1:22 PM CDT  Subject: Other    With my new insurance [employer] ?

## 2020-04-17 NOTE — PATIENT INSTRUCTIONS
Patient reassured no acute fracture on x-ray of her foot. Patient encouraged to rest ice elevate foot. Patient to use ibuprofen and Norco as needed for pain. Patient encouraged to use supportive shoe anytime that she needs to be up and walking.   Refill

## 2020-05-20 ENCOUNTER — PATIENT MESSAGE (OUTPATIENT)
Dept: FAMILY MEDICINE CLINIC | Facility: CLINIC | Age: 40
End: 2020-05-20

## 2020-05-20 RX ORDER — HYDROCODONE BITARTRATE AND ACETAMINOPHEN 5; 325 MG/1; MG/1
1 TABLET ORAL EVERY 8 HOURS PRN
Qty: 90 TABLET | Refills: 0 | Status: SHIPPED | OUTPATIENT
Start: 2020-05-20 | End: 2020-06-17

## 2020-05-20 NOTE — TELEPHONE ENCOUNTER
Future appt:     Your appointments     Date & Time Appointment Department [facility])    Jun 01, 2020  9:30 AM CDT Follow up Visit with Amita Allen MD [address], [place] Mitchell ([facility])    Please arrive 15 minut

## 2020-05-20 NOTE — TELEPHONE ENCOUNTER
From: Leonor Hebert  To: Kenia Everett MD  Sent: 5/20/2020 1:23 PM CDT  Subject: Non-Urgent Medical Question    refill norco 5gm

## 2020-05-27 ENCOUNTER — TELEPHONE (OUTPATIENT)
Dept: FAMILY MEDICINE CLINIC | Facility: CLINIC | Age: 40
End: 2020-05-27

## 2020-05-27 NOTE — TELEPHONE ENCOUNTER
Dustin Samayoa  verbally consents to a [employer] on 5/27/2020. Patient understands and accepts financial responsibility for any deductible, co-insurance and/or co-pays associated with this service.     [phone] Phone vis

## 2020-06-17 ENCOUNTER — PATIENT MESSAGE (OUTPATIENT)
Dept: FAMILY MEDICINE CLINIC | Facility: CLINIC | Age: 40
End: 2020-06-17

## 2020-06-17 RX ORDER — HYDROCODONE BITARTRATE AND ACETAMINOPHEN 5; 325 MG/1; MG/1
1 TABLET ORAL EVERY 8 HOURS PRN
Qty: 90 TABLET | Refills: 0 | Status: SHIPPED | OUTPATIENT
Start: 2020-06-17 | End: 2020-07-14

## 2020-06-17 NOTE — TELEPHONE ENCOUNTER
Future appt:    Last Appointment with provider:   2/19/2020  Last appointment at [facility]:  4/17/2020  No results found for: CHOLEST, HDL, LDL, TRIGLY, TRIG  No results found for: EAG, A1C  No results found for: T4F, TSH, TSHT4    No follow-ups on file

## 2020-06-17 NOTE — TELEPHONE ENCOUNTER
From: Kimmie Lucas  To: Erasmo Juarez MD  Sent: 6/17/2020 10:35 AM CDT  Subject: Other    refill norco

## 2020-07-14 ENCOUNTER — PATIENT MESSAGE (OUTPATIENT)
Dept: FAMILY MEDICINE CLINIC | Facility: CLINIC | Age: 40
End: 2020-07-14

## 2020-07-14 RX ORDER — HYDROCODONE BITARTRATE AND ACETAMINOPHEN 5; 325 MG/1; MG/1
1 TABLET ORAL EVERY 8 HOURS PRN
Qty: 90 TABLET | Refills: 0 | Status: SHIPPED | OUTPATIENT
Start: 2020-07-14 | End: 2020-08-14

## 2020-07-14 NOTE — TELEPHONE ENCOUNTER
From: Bernadine Solis  To: Vee Scott MD  Sent: 7/14/2020 10:57 AM CDT  Subject: Other    i had to r/s my appt from 7/24 due to court for [facility]   need refill on [place]

## 2020-08-14 PROBLEM — H00.15 CHALAZION OF LEFT LOWER EYELID: Status: ACTIVE | Noted: 2020-08-14

## 2020-08-14 PROBLEM — Z85.3 HISTORY OF BREAST CANCER: Status: ACTIVE | Noted: 2020-08-14

## 2020-08-14 NOTE — PROGRESS NOTES
[address]  PROGRESS NOTE  Chief Complaint:   Patient presents with: Follow - Up      HPI:   This is a 42year old female coming in for follow-up. She said she is actually doing very well right now.   She has taken over managing the t MCH 31.0 26.0 - 34.0 pg    MCHC 33.9 31.0 - 37.0 g/dL    RDW 13.0 11.0 - 15.0 %    RDW-SD 43.8 35.1 - 46.3 fL    Neutrophil Absolute Prelim 4.07 1.50 - 7.70 x10 (3) uL    Neutrophil Absolute 4.07 1.50 - 7.70 x10(3) uL    Lymphocyte Absolute 2.82 1.00 - Hives/Urticaria  Progestins              HIVES  Pseudoephedrine         PALPITATIONS  Azithromycin            OTHER (SEE COMMENTS)    Comment:Other reaction(s):  Other (see Comments)             Severe abd pain             Other reaction(s): severe abd pain needed for Pain. 60 tablet 3   • Ondansetron HCl (ZOFRAN) 4 mg tablet TAKE 1 TABLET(4 MG) BY MOUTH EVERY 8 HOURS AS NEEDED FOR NAUSEA 20 tablet 0   • Esomeprazole Magnesium (NEXIUM 24HR) 20 MG Oral Tab EC Take 1 tablet by mouth daily.      • Ferrous Sulfate or polydipsia. ALLERGIES:  Denies allergic response, history of asthma, sneezing, hives, eczema or rhinitis.      EXAM:   /70   Pulse 96   Temp 98.1 °F (36.7 °C) (Temporal)   Resp 16   Ht 62\"   Wt 125 lb (56.7 kg)   LMP 12/07/2016 (Approximate)   Sp Norco to 1 a day. She said she would accept that challenge and work on weaning the dose down. 3. Chalazion of left lower eyelid  She has developed small chalazion of the left lower eyelid. Plan: No treatment now.   If it is bothersome, she can see the Menopausal syndrome     Cellulitis of chest wall     S/P breast reconstruction, bilateral     Symmastia     Allergic contact dermatitis due to adhesives     Yeast vaginitis     Lumbar radiculopathy     Acute non intractable tension-type headache     L5-S

## 2020-09-10 ENCOUNTER — PATIENT MESSAGE (OUTPATIENT)
Dept: FAMILY MEDICINE CLINIC | Facility: CLINIC | Age: 40
End: 2020-09-10

## 2020-09-10 DIAGNOSIS — G89.4 CHRONIC PAIN SYNDROME: Primary | ICD-10-CM

## 2020-09-10 NOTE — TELEPHONE ENCOUNTER
Current Hydrocodone script is for one tablet every 8hrs prn for pain. Last refill was # 90 tablets  0 RF on 8/14/20. Patient with chronic pain syndrome. See office visit 8/14/20. Dr. Vinny Sewell suggested working on decreasing Norco to one daily.  Patient

## 2020-09-10 NOTE — TELEPHONE ENCOUNTER
From: Samy Leon  To: Sarika Turk MD  Sent: 9/10/2020 2:15 PM CDT  Subject: Non-Urgent Medical Question    med refill   norco and flexeril

## 2020-09-11 RX ORDER — HYDROCODONE BITARTRATE AND ACETAMINOPHEN 5; 325 MG/1; MG/1
1 TABLET ORAL EVERY 8 HOURS PRN
Qty: 90 TABLET | Refills: 0 | Status: SHIPPED | OUTPATIENT
Start: 2020-09-11 | End: 2020-10-09

## 2020-09-11 RX ORDER — CYCLOBENZAPRINE HCL 10 MG
10 TABLET ORAL 3 TIMES DAILY PRN
Qty: 30 TABLET | Refills: 3 | Status: SHIPPED | OUTPATIENT
Start: 2020-09-11 | End: 2021-05-19

## 2020-10-09 DIAGNOSIS — G89.4 CHRONIC PAIN SYNDROME: ICD-10-CM

## 2020-10-09 RX ORDER — HYDROCODONE BITARTRATE AND ACETAMINOPHEN 5; 325 MG/1; MG/1
1 TABLET ORAL EVERY 8 HOURS PRN
Qty: 90 TABLET | Refills: 0 | Status: SHIPPED | OUTPATIENT
Start: 2020-10-09 | End: 2020-11-06

## 2020-10-09 NOTE — TELEPHONE ENCOUNTER
Future appt:     Your appointments     Date & Time Appointment Department [facility])    Dec 11, 2020  2:30 PM CST Follow up Visit with Nicole Matthews MD [address] [place] [address] ([facility])    Please arrive 15 minut

## 2020-10-27 ENCOUNTER — VIRTUAL PHONE E/M (OUTPATIENT)
Dept: FAMILY MEDICINE CLINIC | Facility: CLINIC | Age: 40
End: 2020-10-27
Payer: COMMERCIAL

## 2020-10-27 ENCOUNTER — TELEPHONE (OUTPATIENT)
Dept: FAMILY MEDICINE CLINIC | Facility: CLINIC | Age: 40
End: 2020-10-27

## 2020-10-27 VITALS — BODY MASS INDEX: 23.03 KG/M2 | HEIGHT: 61 IN | WEIGHT: 122 LBS

## 2020-10-27 DIAGNOSIS — J06.9 VIRAL UPPER RESPIRATORY TRACT INFECTION: Primary | ICD-10-CM

## 2020-10-27 PROCEDURE — 3008F BODY MASS INDEX DOCD: CPT | Performed by: NURSE PRACTITIONER

## 2020-10-27 PROCEDURE — 99213 OFFICE O/P EST LOW 20 MIN: CPT | Performed by: NURSE PRACTITIONER

## 2020-10-27 RX ORDER — ALBUTEROL SULFATE 90 UG/1
AEROSOL, METERED RESPIRATORY (INHALATION)
Qty: 8.5 G | Refills: 1 | Status: SHIPPED | OUTPATIENT
Start: 2020-10-27 | End: 2021-02-12

## 2020-10-27 NOTE — TELEPHONE ENCOUNTER
Patient states she has had a headache x3 days. States she has also had increased fatigue/muscle pain/slept 24hrs and still tired. Questions Covid19 Testing. Please advise.

## 2020-10-27 NOTE — TELEPHONE ENCOUNTER
Future appt:     Your appointments     Date & Time Appointment Department [facility])    Dec 11, 2020  2:30 PM CST Follow up Visit with Scotty Alarcon MD [address] [place] [address] ([facility])    Please arrive 15 minut

## 2020-10-27 NOTE — TELEPHONE ENCOUNTER
Phone visit given today 3pm with Yazmin Tirado for  Evaluation of Sx.   Jennie Every, 10/27/20, 11:58 AM

## 2020-10-27 NOTE — PROGRESS NOTES
Blair Infante  verbally consents to a [employer] on 10/27/2020. Patient understands and accepts financial responsibility for any deductible, co-insurance and/or co-pays associated with this service.     Duration of the servi tablet 3   • Potassium Chloride ER (KLOR-CON 10) 10 MEQ Oral Tab CR Take 1 tablet (10 mEq total) by mouth 2 (two) times daily. 180 tablet 3   • Montelukast Sodium (SINGULAIR) 10 MG Oral Tab Take 1 tablet (10 mg total) by mouth daily.  30 tablet 3   • Black HPI  SKIN: no unusual skin lesions or rashes  HEENT: See HPI  LUNGS: No cough, shortness of breath, or wheezing. CARDIOVASCULAR: denies complaints   GI: No N/V/C/D.   NEURO: denies complaints    EXAM:   Ht 61\"   Wt 122 lb (55.3 kg)   LMP 12/07/2016 (Appr

## 2020-10-27 NOTE — PATIENT INSTRUCTIONS
Rest, increase fluids, salt water gargles ,neti pot (use distilled water) or saline nasal spray, Mucinex-DM,vicks vapo rub, halls,  Tylenol/Ibuprofen, follow up if symptoms persist or increase.

## 2020-10-29 ENCOUNTER — TELEPHONE (OUTPATIENT)
Dept: FAMILY MEDICINE CLINIC | Facility: CLINIC | Age: 40
End: 2020-10-29

## 2020-10-29 NOTE — TELEPHONE ENCOUNTER
patient would like to know her Covid results  Future Appointments   Date Time Provider Jerrod Gonzalez   12/11/2020  2:30 PM Eri Walls MD [facility]

## 2020-10-30 NOTE — TELEPHONE ENCOUNTER
Winsome Aparicio out of the off the office, COVID results are negative. Follow up if symptoms ongoing.

## 2020-11-06 ENCOUNTER — PATIENT MESSAGE (OUTPATIENT)
Dept: FAMILY MEDICINE CLINIC | Facility: CLINIC | Age: 40
End: 2020-11-06

## 2020-11-06 DIAGNOSIS — G89.4 CHRONIC PAIN SYNDROME: ICD-10-CM

## 2020-11-06 NOTE — TELEPHONE ENCOUNTER
Future appt:     Your appointments     Date & Time Appointment Department [facility])    Dec 11, 2020  2:30 PM CST Follow up Visit with Carlos Osman MD [address], Stevphen Canavan ([facility])    Please arrive 15 minut

## 2020-11-09 RX ORDER — HYDROCODONE BITARTRATE AND ACETAMINOPHEN 5; 325 MG/1; MG/1
1 TABLET ORAL EVERY 8 HOURS PRN
Qty: 90 TABLET | Refills: 0 | Status: SHIPPED | OUTPATIENT
Start: 2020-11-09 | End: 2020-12-11

## 2020-11-09 NOTE — TELEPHONE ENCOUNTER
From: Olga Alvarez  To:  Sarbjit Lopez MD  Sent: 11/6/2020 7:16 PM CST  Subject: Non-Urgent Medical Question    REFILL Alex Mobley

## 2020-11-09 NOTE — TELEPHONE ENCOUNTER
Norco:  10/9/20      Future appt:     Your appointments     Date & Time Appointment Department [facility])    Dec 11, 2020  2:30 PM CST Follow up Visit with Celi Chavez MD [address] [place] [address] ([facility])    P

## 2020-11-09 NOTE — TELEPHONE ENCOUNTER
See Refill Request from Pharmacy      Future appt:     Your appointments     Date & Time Appointment Department [facility])    Dec 11, 2020  2:30 PM CST Follow up Visit with Carson William MD [address] [place] [address]

## 2020-11-25 ENCOUNTER — PATIENT MESSAGE (OUTPATIENT)
Dept: FAMILY MEDICINE CLINIC | Facility: CLINIC | Age: 40
End: 2020-11-25

## 2020-11-25 ENCOUNTER — TELEPHONE (OUTPATIENT)
Dept: FAMILY MEDICINE CLINIC | Facility: CLINIC | Age: 40
End: 2020-11-25

## 2020-11-25 RX ORDER — DIAZEPAM 10 MG/1
10 TABLET ORAL
Qty: 2 TABLET | Refills: 0 | Status: SHIPPED | OUTPATIENT
Start: 2020-11-25 | End: 2020-11-25

## 2020-11-25 NOTE — TELEPHONE ENCOUNTER
Prescription sent in for Valium 10 mg tablets prior to the procedure. Take 1 tablet 2 hours prior to the procedure. A second tablet may be taken at the procedure if needed.

## 2020-11-25 NOTE — TELEPHONE ENCOUNTER
From: Candi Crigler  To:  Dennise Madison MD  Sent: 11/25/2020 11:03 AM CST  Subject: Non-Urgent Medical Question    saw dr Jing Martinez and we are Mor Whitman get mri of my brain next tues 12/01 since i get freaked out when im in there i am requesting the me

## 2020-11-25 NOTE — TELEPHONE ENCOUNTER
To: [facility]      From: Teresa Mendoza      Created: 11/25/2020 11:03 AM        *-*-*This message has not been handled. *-*-*    saw dr Abigail Duane and we are Claude get  mri of my brain next tues 12/01 since i get freaked out when im in

## 2020-11-27 RX ORDER — POTASSIUM CHLORIDE 750 MG/1
TABLET, FILM COATED, EXTENDED RELEASE ORAL
Qty: 180 TABLET | Refills: 1 | Status: SHIPPED | OUTPATIENT
Start: 2020-11-27 | End: 2021-04-28

## 2020-11-27 RX ORDER — FUROSEMIDE 40 MG/1
TABLET ORAL
Qty: 90 TABLET | Refills: 1 | Status: SHIPPED | OUTPATIENT
Start: 2020-11-27 | End: 2021-04-28

## 2020-11-27 NOTE — TELEPHONE ENCOUNTER
Furosemide: 2/26/20  Potassium: 9/3/2019    Future appt:     Your appointments     Date & Time Appointment Department [facility])    Dec 11, 2020  2:30 PM CST Follow up Visit with Greta Puente MD [address] [place] [address] DR PATRICIA TORRES [facility]

## 2020-12-11 ENCOUNTER — OFFICE VISIT (OUTPATIENT)
Dept: FAMILY MEDICINE CLINIC | Facility: CLINIC | Age: 40
End: 2020-12-11
Payer: COMMERCIAL

## 2020-12-11 VITALS
TEMPERATURE: 98 F | SYSTOLIC BLOOD PRESSURE: 108 MMHG | OXYGEN SATURATION: 99 % | RESPIRATION RATE: 16 BRPM | WEIGHT: 123 LBS | BODY MASS INDEX: 22.63 KG/M2 | DIASTOLIC BLOOD PRESSURE: 58 MMHG | HEIGHT: 62 IN | HEART RATE: 100 BPM

## 2020-12-11 DIAGNOSIS — F32.0 CURRENT MILD EPISODE OF MAJOR DEPRESSIVE DISORDER WITHOUT PRIOR EPISODE (HCC): ICD-10-CM

## 2020-12-11 DIAGNOSIS — G89.4 CHRONIC PAIN SYNDROME: Primary | ICD-10-CM

## 2020-12-11 PROBLEM — H00.15 CHALAZION OF LEFT LOWER EYELID: Status: RESOLVED | Noted: 2020-08-14 | Resolved: 2020-12-11

## 2020-12-11 PROBLEM — J40 BRONCHITIS: Status: RESOLVED | Noted: 2019-07-30 | Resolved: 2020-12-11
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
## 2020-12-11 PROCEDURE — 3078F DIAST BP <80 MM HG: CPT | Performed by: FAMILY MEDICINE

## 2020-12-11 PROCEDURE — 3008F BODY MASS INDEX DOCD: CPT | Performed by: FAMILY MEDICINE

## 2020-12-11 PROCEDURE — 3074F SYST BP LT 130 MM HG: CPT | Performed by: FAMILY MEDICINE

## 2020-12-11 PROCEDURE — 90686 IIV4 VACC NO PRSV 0.5 ML IM: CPT | Performed by: FAMILY MEDICINE

## 2020-12-11 PROCEDURE — 99213 OFFICE O/P EST LOW 20 MIN: CPT | Performed by: FAMILY MEDICINE

## 2020-12-11 PROCEDURE — 90471 IMMUNIZATION ADMIN: CPT | Performed by: FAMILY MEDICINE

## 2020-12-11 RX ORDER — HYDROCODONE BITARTRATE AND ACETAMINOPHEN 5; 325 MG/1; MG/1
1 TABLET ORAL 2 TIMES DAILY PRN
Qty: 60 TABLET | Refills: 0 | Status: SHIPPED | OUTPATIENT
Start: 2020-12-11 | End: 2021-01-04

## 2020-12-11 NOTE — PROGRESS NOTES
[address]  PROGRESS NOTE  Chief Complaint:   Patient presents with: Follow - Up: med check      HPI:   This is a 42year old female coming in for follow-up on her pain medicine. She said that she feels like she is doing very well.   Sh 450.0 10(3)uL    MCV 91.4 80.0 - 100.0 fL    MCH 31.0 26.0 - 34.0 pg    MCHC 33.9 31.0 - 37.0 g/dL    RDW 13.0 11.0 - 15.0 %    RDW-SD 43.8 35.1 - 46.3 fL    Neutrophil Absolute Prelim 4.07 1.50 - 7.70 x10 (3) uL    Neutrophil Absolute 4.07 1.50 - 7.70 x10 Comment:Other reaction(s): Hives/Urticaria  Progestins              HIVES  Pseudoephedrine         PALPITATIONS  Azithromycin            OTHER (SEE COMMENTS)    Comment:Other reaction(s):  Other (see Comments)             Severe abd pain             Other tablet 0   • Esomeprazole Magnesium (NEXIUM 24HR) 20 MG Oral Tab EC Take 1 tablet by mouth daily. • estradiol 1 MG Oral Tab Take 1 mg by mouth daily. • docusate sodium 100 MG Oral Cap Take 100 mg by mouth daily.      • Biotin 82992 MCG Oral Tablet D m)   Wt 123 lb (55.8 kg)   LMP 12/07/2016 (Approximate)   SpO2 99%   BMI 22.50 kg/m²  Estimated body mass index is 22.5 kg/m² as calculated from the following:    Height as of this encounter: 5' 2\" (1.575 m).     Weight as of this encounter: 123 lb (55.8 k (two) times daily as needed for Pain.        Health Maintenance:  Annual Physical due on 12/14/1985  Influenza Vaccine(1) due on 10/01/2020  Annual Depression Screen due on 01/13/2021    Patient/Caregiver Education: Patient/Caregiver Education: There are no

## 2021-01-04 ENCOUNTER — PATIENT MESSAGE (OUTPATIENT)
Dept: FAMILY MEDICINE CLINIC | Facility: CLINIC | Age: 41
End: 2021-01-04

## 2021-01-04 DIAGNOSIS — G89.4 CHRONIC PAIN SYNDROME: ICD-10-CM

## 2021-01-04 RX ORDER — HYDROCODONE BITARTRATE AND ACETAMINOPHEN 5; 325 MG/1; MG/1
1 TABLET ORAL 2 TIMES DAILY PRN
Qty: 60 TABLET | Refills: 0 | Status: SHIPPED | OUTPATIENT
Start: 2021-01-04 | End: 2021-02-04

## 2021-01-04 NOTE — TELEPHONE ENCOUNTER
Norco:  12/11/20    Future appt:    Last Appointment with provider:   12/11/2020  Last appointment at [facility]:  12/11/2020  No results found for: CHOLEST, HDL, LDL, TRIGLY, TRIG  No results found for: EAG, A1C  No results found for: T4F, TSH, TSHT4

## 2021-01-04 NOTE — TELEPHONE ENCOUNTER
From: Tavo Woodward  To: Kerri Kwon MD  Sent: 1/4/2021 12:34 PM CST  Subject: Other    needing refill on norco i will be out on saturday i am leaving town wed night if i can get that filled before i leave and call pharmacy .  let me know thank

## 2021-01-05 ENCOUNTER — PATIENT MESSAGE (OUTPATIENT)
Dept: FAMILY MEDICINE CLINIC | Facility: CLINIC | Age: 41
End: 2021-01-05

## 2021-01-05 NOTE — TELEPHONE ENCOUNTER
From: Benja Bosch  To: Valery Bain MD  Sent: 1/5/2021 1:10 PM CST  Subject: Other    can you call [facility] in [place] to ok to refill 2 days early.  I will be leaving tomorrow   thank you

## 2021-01-05 NOTE — TELEPHONE ENCOUNTER
Patient leaving to go out of town tomorrow for the  Weekend. Requesting Jordy Gonzalez to be called for  Norco to be filled 2 days early/done.   Maciej Hussein, 01/05/21, 1:49 PM

## 2021-02-04 ENCOUNTER — PATIENT MESSAGE (OUTPATIENT)
Dept: FAMILY MEDICINE CLINIC | Facility: CLINIC | Age: 41
End: 2021-02-04

## 2021-02-04 DIAGNOSIS — G89.4 CHRONIC PAIN SYNDROME: ICD-10-CM

## 2021-02-04 NOTE — TELEPHONE ENCOUNTER
Future appt:     Last Appointment with provider:   12/11/2020; Return in about 3 months (around 3/11/2021).     Last appointment at [facility]:  12/11/2020  No results found for: CHOLEST, HDL, LDL, TRIGLY, TRIG  No results found for: EAG, A1C  No results

## 2021-02-04 NOTE — TELEPHONE ENCOUNTER
From: Joanne Arroyo  To:  Warren Mcintyre MD  Sent: 2/4/2021 9:54 AM CST  Subject: Non-Urgent Medical Question    REFILL OF NORCO TO Rosalva Livingston  ALSO I SAW DR LEOS AND HE HAS GIVEN ME COUPLE ORDERS FOR BLOOD WORK TO BE DONE TO CHECK MY HORMONES OR

## 2021-02-05 RX ORDER — HYDROCODONE BITARTRATE AND ACETAMINOPHEN 5; 325 MG/1; MG/1
1 TABLET ORAL 2 TIMES DAILY PRN
Qty: 60 TABLET | Refills: 0 | Status: SHIPPED | OUTPATIENT
Start: 2021-02-05 | End: 2021-03-02

## 2021-02-10 ENCOUNTER — TELEPHONE (OUTPATIENT)
Dept: FAMILY MEDICINE CLINIC | Facility: CLINIC | Age: 41
End: 2021-02-10

## 2021-02-10 NOTE — TELEPHONE ENCOUNTER
Patient states she has had purple stools x2 days. States she has not been drinking alcohol. States she has been drinking way too much pop/energy drinks. Has not had the best diet by way of eating enough food.     Patient states she had a little bit

## 2021-02-10 NOTE — TELEPHONE ENCOUNTER
Purple stools are probably related to the energy drinks. Please stop doing that and that should help with the purple stools.

## 2021-02-12 RX ORDER — ALBUTEROL SULFATE 90 UG/1
AEROSOL, METERED RESPIRATORY (INHALATION)
Qty: 8.5 G | Refills: 1 | Status: SHIPPED | OUTPATIENT
Start: 2021-02-12 | End: 2021-12-10

## 2021-02-12 NOTE — TELEPHONE ENCOUNTER
Future appt:     Last Appointment with provider:  12/11/2020; Return in about 3 months (around 3/11/2021).     Last appointment at [facility]:  12/11/2020  No results found for: CHOLEST, HDL, LDL, TRIGLY, TRIG  No results found for: EAG, A1C  No results f

## 2021-03-02 ENCOUNTER — PATIENT MESSAGE (OUTPATIENT)
Dept: FAMILY MEDICINE CLINIC | Facility: CLINIC | Age: 41
End: 2021-03-02

## 2021-03-02 DIAGNOSIS — G89.4 CHRONIC PAIN SYNDROME: ICD-10-CM

## 2021-03-02 RX ORDER — HYDROCODONE BITARTRATE AND ACETAMINOPHEN 5; 325 MG/1; MG/1
1 TABLET ORAL 2 TIMES DAILY PRN
Qty: 60 TABLET | Refills: 0 | Status: SHIPPED | OUTPATIENT
Start: 2021-03-02 | End: 2021-03-31

## 2021-03-02 NOTE — TELEPHONE ENCOUNTER
From: Brown Roach  To: Mike Ricketts MD  Sent: 3/2/2021 12:33 PM CST  Subject: Non-Urgent Medical Question    Need refill on norco   checking I sent orders for labs over last month am i able to get them done there?

## 2021-03-02 NOTE — TELEPHONE ENCOUNTER
From: Bernadine Solis  To:  Erickson Latif MD  Sent: 3/2/2021 1:46 PM CST  Subject: Non-Urgent Medical Question    attached are lab orders  let me know if I can do them over there

## 2021-03-02 NOTE — TELEPHONE ENCOUNTER
Future appt:    Last Appointment with provider:   12/11/2020  Last appointment at [facility]:  12/11/2020  No results found for: CHOLEST, HDL, LDL, TRIGLY, TRIG  No results found for: EAG, A1C  No results found for: T4F, TSH, TSHT4    No follow-ups on fi

## 2021-03-02 NOTE — TELEPHONE ENCOUNTER
Future appt:    Last Appointment with provider:   12/11/2020  Last appointment at [facility]:  12/11/2020  No results found for: CHOLEST, HDL, LDL, TRIGLY, TRIG  No results found for: EAG, A1C  No results found for: T4F, TSH, TSHT4    No follow-ups on fi

## 2021-03-11 ENCOUNTER — LAB ENCOUNTER (OUTPATIENT)
Dept: LAB | Age: 41
End: 2021-03-11
Attending: FAMILY MEDICINE
Payer: COMMERCIAL

## 2021-03-11 DIAGNOSIS — D35.2 PITUITARY ADENOMA (HCC): Primary | ICD-10-CM

## 2021-03-11 LAB
CORTIS SERPL-MCNC: 8.6 UG/DL
ESTRADIOL SERPL-MCNC: 36 PG/ML
FSH SERPL-ACNC: 47.1 MIU/ML
LH SERPL-ACNC: 32 MIU/ML
PROLACTIN SERPL-MCNC: 6.2 NG/ML
T4 FREE SERPL-MCNC: 0.9 NG/DL (ref 0.8–1.7)
TSI SER-ACNC: 0.95 MIU/ML (ref 0.36–3.74)

## 2021-03-11 PROCEDURE — 36415 COLL VENOUS BLD VENIPUNCTURE: CPT

## 2021-03-11 PROCEDURE — 82533 TOTAL CORTISOL: CPT

## 2021-03-11 PROCEDURE — 84146 ASSAY OF PROLACTIN: CPT

## 2021-03-11 PROCEDURE — 83001 ASSAY OF GONADOTROPIN (FSH): CPT

## 2021-03-11 PROCEDURE — 83002 ASSAY OF GONADOTROPIN (LH): CPT

## 2021-03-11 PROCEDURE — 82024 ASSAY OF ACTH: CPT

## 2021-03-11 PROCEDURE — 84443 ASSAY THYROID STIM HORMONE: CPT

## 2021-03-11 PROCEDURE — 82670 ASSAY OF TOTAL ESTRADIOL: CPT

## 2021-03-11 PROCEDURE — 84305 ASSAY OF SOMATOMEDIN: CPT

## 2021-03-11 PROCEDURE — 84439 ASSAY OF FREE THYROXINE: CPT

## 2021-03-12 LAB — ADRENOCORTICOTROPIC HORMONE: 18.5 PG/ML

## 2021-03-13 LAB
IGF 1 Z SCORE CALCULATION: -0.7
IGF-1 (INSULINE-LIKE GROWTH FACTOR 1): 124 NG/ML

## 2021-03-31 ENCOUNTER — PATIENT MESSAGE (OUTPATIENT)
Dept: FAMILY MEDICINE CLINIC | Facility: CLINIC | Age: 41
End: 2021-03-31

## 2021-03-31 DIAGNOSIS — G89.4 CHRONIC PAIN SYNDROME: ICD-10-CM

## 2021-03-31 RX ORDER — HYDROCODONE BITARTRATE AND ACETAMINOPHEN 5; 325 MG/1; MG/1
1 TABLET ORAL 2 TIMES DAILY PRN
Qty: 60 TABLET | Refills: 0 | Status: SHIPPED | OUTPATIENT
Start: 2021-03-31 | End: 2021-04-28

## 2021-03-31 NOTE — TELEPHONE ENCOUNTER
From: Samy Leon  To:  María Huertas MD  Sent: 3/31/2021 4:27 PM CDT  Subject: Prescription Question    refill norco to evonne xiong

## 2021-03-31 NOTE — TELEPHONE ENCOUNTER
Future appt:     Your appointments     Date & Time Appointment Department [facility])    Apr 28, 2021  9:00 AM CDT Follow up Visit with Paul Cornelius MD [address] [place] [address] ([facility])    Contact your primary c

## 2021-03-31 NOTE — TELEPHONE ENCOUNTER
Norco:  3/2/21    Future appt:    Last Appointment with provider:   12/11/2020  Last appointment at [facility]:  12/11/2020  No results found for: CHOLEST, HDL, LDL, TRIGLY, TRIG  No results found for: EAG, A1C  Lab Results   Component Value Date    T2F

## 2021-04-01 NOTE — TELEPHONE ENCOUNTER
Future appt:     Your appointments     Date & Time Appointment Department [facility])    Apr 28, 2021  9:00 AM CDT Follow up Visit with Helen Dexter MD [address] [facility], [facility] ([facility] Rangel)    Contact your primary c

## 2021-04-02 RX ORDER — IBUPROFEN 800 MG/1
TABLET ORAL
Qty: 30 TABLET | Refills: 1 | Status: SHIPPED | OUTPATIENT
Start: 2021-04-02 | End: 2021-12-03

## 2021-04-03 NOTE — TELEPHONE ENCOUNTER
Chief Complaint   Patient presents with   • Office Visit   • Follow-up   • Neck   • Back Pain     Date of Injury: years  Initial Treatment Date: 2021  Date Last Seen: 3/24/2021  Mechanism of Onset: other  Occupation: Night audit  Referred by: Enzo Cabrera DC  Primary Care Physician: Morenita Mancia MD  Date informed consent signed: 2021  I have reviewed the past medical history, family history, social history, medications and allergies listed in the medical record as obtained by my nursing staff and support staff and agree with their documentation.  Vicky  reports that she has been smoking cigarettes. She has a 3.75 pack-year smoking history. She has never used smokeless tobacco.  Vicky is allergic to bee; codeine; shell fish; clindamycin; mobic; penicillins; chloraprep one step; and hydrocodone.  Visit Number of Current Episode: 3  Initial Pain Ratin/10  Initial PROM 55 %    COVID-19 Screening:    • Does the patient OR patient’s household members have any of the following symptoms?  o Temperature: Fever ?100.0°F or ?37.8°C?  No  o Respiratory symptoms: New or worsening cough, shortness of breath, or sore throat? No  o GI symptoms: New onset of nausea, vomiting or diarrhea?  No  o Miscellaneous: New onset of loss of taste or smell, chills, repeated shaking with chills, muscle pain or headache?  No  • Has the patient or a household member tested positive for COVID-19 in the last 14 days?  No  • Has the patient or a household member been tested for COVID-19 and are waiting for the results?  No      SUBJECTIVE:  'Mayo is a pleasant 46 year old female that presents to our office today for treatment of shoulder, mid to low back and left hip pain. Patient rates her pain today at 7/10 with 10 being worst. Patient states on Thursday night she \"feels like she was hit by a truck\". Her left shoulder is very sore and is having difficulty sleeping.           Relevant Diagnostic Imaging:   X-ray (elbow)  Future appt:     Your appointments     Date & Time Appointment Department [facility])    Apr 18, 2018 11:30 AM CDT Follow up - Extended with Sim Michael MD [address] [place] [facility], [facility]        [facility] 04/13/2019; X-ray (Cervical) 03/15/2018; MRI (lumbar) 11/15/2018    OBJECTIVE FINDINGS:   Patient Emotional screening was completed 03/22/2021; DoD/VA Pain Supplemental Questionnaire score was 22/40.    During the past 24 hours, how has pain interfered with normal activities: 7/10  During the past 24 hours, how has pain interfered with your sleep: 7-8/10  During the past 24 hours, how has pain affected your mood: 0/10  During the past 24 hours, how has pain contributed to your stress: 7/10    Patient Recorded Objective Measure is 55 %    Problem Focused Examination revealed:    (Thoracic spine) Thoracic spine facet joint function is within normal limits except for her left T 5/6/7/8/9 and T 10/11/12 facet joint  that exhibited limited passive range of motion and segmental restriction and tenderness upon palpation; The following muscles were examined for normal flexibility and tone; right and left rhomboid muscle; right and left serratus muscle; left and right latissimus dorsi muscle; These muscles were within normal limits except for her left serratus muscle that exhibited limited flexibility and abnormal resting tone.    (Shoulder) The following muscles were examined for normal flexibility and tone: right supraspinatus, left supraspinatus, right subscapularis, left subscapularis, right bicep, left bicep, right teres, left teres, and left and right infraspinatus muscles. These muscles were found to be within normal limits except for her left pectoralis minor muscle that exhibited limited flexibility and were hypertonic at rest.    (Lumbar and Pelvic Spine)Lumbar spine facet joint function is within normal limits; Sacroiliac joint function is within normal limits except for her left Sacroiliac joint that exhibited limited passive range of motion and joint restriction; The following muscles were examined for flexibility and tone at rest; right and left hip flexor muscle; right and left hip adductor muscle; right and  left hip rotator muscle; right and left piriformis muscle; right and left hamstring muscle; right and left Gluteus medius muscle and gluteus avelina muscle; right and left quadratus lumborum muscle; left and right Tensor fasciae latae muscle; left and right internal hip rotators muscle; right and left quadriceps muscle. These muscles were found to be within normal limits except for her left gluteus avelina muscle, left piriformis muscle, left hamstring muscle, left quadratus lumborum(QL), left lumbar paraspinal muscles that exhibited limited flexibility and were hypertonic at rest.    (Hip)  Range of motion is within normal limits       Orthopedic/Neurological tests:  None    Directional Preference:   None    Assessment:   1. Pelvic somatic dysfunction    2. Pain in thoracic spine    3. Thoracic region somatic dysfunction    4. Lumbar region somatic dysfunction    5. Sacral region somatic dysfunction    6. SI (sacroiliac) pain    7. Chronic bilateral low back pain without sciatica        Complicating Factors/Comorbidities:   None noted at this time.    Plan:  Patient was evaluated and then treated with manipulation to her thoracic spine and pelvic spine via diversified manipulation technique to her lumbar and sacral spine via Velasquez distraction technique to improve function and passive range of motion of facet joints.  Patient also treated with contract/relax stretch to muscle noted as taut in objective findings to improve flexibility and decrease strain to spinal structures. Patient also treated with lumbar distraction x 5 minutes to stretch lumbar paraspinal muscle. and centralize possible contained migration of lumbar intervertebral nucleus.       While manipulation to patient's extremity was performed today, we will not be billing for the procedure under the policy of population health management.      Therapeutic Exercise/Rehab/Modalities performed today:   none    Patient Instruction/Education: none.     Patient  stated understanding of, and was in agreement with, the discussed instructions.  Goals of Care: Goal of care is to improve muscular and skeletal function and provide symptom relief.   Additional Goals Include and to be obtained by end of this plan of care.   To be obtained by end of this plan of care:  1. Patient independent with modified and progressed home exercise program.  2. Patient will decrease symptoms by 60-80% of current Visual Analog Pain Scale Score.  3. Patient’s active range-of-motion will be within normal limits with no/minimal pain.   4. Patient will be able to tolerate standing activities for greater than or equal to 90 minutes without pain/difficulty.  5. Patient will demonstrate proper body mechanics for sitting and standing.  6. Patient will be able to tolerate sitting activities for greater than or equal to 90 minutes without pain/difficulty.  7. Patient will be able to sleep/rollover in bed without pain or difficulty.   8. Patient will be able to walk without pain or difficulty for greater than or equal to 45 minutes.   9. Patient will be able to bend and lift for activities of daily living and instrumental activities of daily living completion at home and work without pain/difficulty.  10. Patient’s DOD/VA pain questionnaire will be decreased by 50-70%.     Other treatment options discussed with patient: none    Patient responded well to treatment today    Patient is to return 2 weeks for continued care and treatment of her condition consistent with plan of care.    Length of Visit: 15 min.    On 4/3/2021, Ibis OLIVEIRA scribed the services personally performed by Enzo Cabrera DC.    The documentation recorded by the scribe accurately and completely reflects the service(s) I personally performed and the decisions made by me.

## 2021-04-28 ENCOUNTER — OFFICE VISIT (OUTPATIENT)
Dept: FAMILY MEDICINE CLINIC | Facility: CLINIC | Age: 41
End: 2021-04-28
Payer: COMMERCIAL

## 2021-04-28 ENCOUNTER — LAB ENCOUNTER (OUTPATIENT)
Dept: LAB | Age: 41
End: 2021-04-28
Attending: FAMILY MEDICINE
Payer: COMMERCIAL

## 2021-04-28 VITALS
SYSTOLIC BLOOD PRESSURE: 100 MMHG | WEIGHT: 133.38 LBS | HEIGHT: 62 IN | HEART RATE: 96 BPM | DIASTOLIC BLOOD PRESSURE: 62 MMHG | RESPIRATION RATE: 20 BRPM | TEMPERATURE: 97 F | BODY MASS INDEX: 24.54 KG/M2

## 2021-04-28 DIAGNOSIS — R60.1 GENERALIZED EDEMA: ICD-10-CM

## 2021-04-28 DIAGNOSIS — D68.0 TYPE 1 VON WILLEBRAND DISEASE (HCC): ICD-10-CM

## 2021-04-28 DIAGNOSIS — F32.0 CURRENT MILD EPISODE OF MAJOR DEPRESSIVE DISORDER WITHOUT PRIOR EPISODE (HCC): ICD-10-CM

## 2021-04-28 DIAGNOSIS — G89.4 CHRONIC PAIN SYNDROME: Primary | ICD-10-CM

## 2021-04-28 PROBLEM — B37.3 YEAST VAGINITIS: Status: RESOLVED | Noted: 2018-01-10 | Resolved: 2021-04-28

## 2021-04-28 PROBLEM — S70.02XA CONTUSION OF LEFT HIP: Status: RESOLVED | Noted: 2019-10-28 | Resolved: 2021-04-28

## 2021-04-28 PROBLEM — L03.313 CELLULITIS OF CHEST WALL: Status: RESOLVED | Noted: 2017-06-27 | Resolved: 2021-04-28

## 2021-04-28 PROBLEM — B37.31 YEAST VAGINITIS: Status: RESOLVED | Noted: 2018-01-10 | Resolved: 2021-04-28

## 2021-04-28 PROCEDURE — 80053 COMPREHEN METABOLIC PANEL: CPT | Performed by: FAMILY MEDICINE

## 2021-04-28 PROCEDURE — 3078F DIAST BP <80 MM HG: CPT | Performed by: FAMILY MEDICINE

## 2021-04-28 PROCEDURE — 85025 COMPLETE CBC W/AUTO DIFF WBC: CPT | Performed by: FAMILY MEDICINE

## 2021-04-28 PROCEDURE — 3074F SYST BP LT 130 MM HG: CPT | Performed by: FAMILY MEDICINE

## 2021-04-28 PROCEDURE — 99214 OFFICE O/P EST MOD 30 MIN: CPT | Performed by: FAMILY MEDICINE

## 2021-04-28 PROCEDURE — 3008F BODY MASS INDEX DOCD: CPT | Performed by: FAMILY MEDICINE

## 2021-04-28 RX ORDER — HYDROCODONE BITARTRATE AND ACETAMINOPHEN 5; 325 MG/1; MG/1
TABLET ORAL
Qty: 45 TABLET | Refills: 0 | Status: SHIPPED | OUTPATIENT
Start: 2021-04-28 | End: 2021-05-26

## 2021-04-28 RX ORDER — FUROSEMIDE 40 MG/1
40 TABLET ORAL DAILY
Qty: 90 TABLET | Refills: 1 | Status: SHIPPED | OUTPATIENT
Start: 2021-04-28 | End: 2021-06-15

## 2021-04-28 RX ORDER — POTASSIUM CHLORIDE 750 MG/1
10 TABLET, FILM COATED, EXTENDED RELEASE ORAL 2 TIMES DAILY
Qty: 180 TABLET | Refills: 1 | Status: SHIPPED | OUTPATIENT
Start: 2021-04-28 | End: 2021-06-15

## 2021-04-28 NOTE — PATIENT INSTRUCTIONS
Please continue the good progress. Norco prescription decreased to 1.5 tablets daily (45 tablets monthly). Recheck in 3 months.

## 2021-04-28 NOTE — PROGRESS NOTES
[address]  PROGRESS NOTE  Chief Complaint:   Patient presents with: Follow - Up: Medication      HPI:   This is a 47year old female coming in for follow-up on her medication. She reports that overall she is doing well.   She said the Procedure Laterality Date   • D & C     • HYSTERECTOMY  Feb , 2016    ROSA ELENA, BSO, lap/vaginal -    • MASTECTOMY MODIFIED RADICAL Bilateral    • MYRINGOTOMY, LASER-ASSISTED     • OTHER SURGICAL HISTORY      Bilateral Mastectomy.   [facility]   • REVISE BREAST R tablet 1   • ALBUTEROL SULFATE  (90 Base) MCG/ACT Inhalation Aero Soln INHALE 2 PUFFS INTO THE LUNGS EVERY 4 HOURS AS NEEDED FOR WHEEZING 8.5 g 1   • cyclobenzaprine 10 MG Oral Tab Take 1 tablet (10 mg total) by mouth 3 (three) times daily as needed constipation, diarrhea, or blood in stool. MUSCULOSKELETAL:  Denies weakness, muscle aches, back pain, joint pain, swelling or stiffness.   NEUROLOGICAL:  Denies headache, seizures, dizziness, syncope, paralysis, ataxia, numbness or tingling in the extremi ([facility]) 5-325 MG Oral Tab; Take 1.5 tablets daily for pain as needed. Dispense: 45 tablet; Refill: 0    2. Type 1 von Willebrand disease (HCC)  Unchanged. Plan: Check labs. - COMP METABOLIC PANEL (14);  Future  - CBC WITH DIFFERENTIAL WITH PLATELET; Futu hysterectomy     Female infertility     Type 1 von Willebrand disease ([facility]     Menopausal syndrome     S/P breast reconstruction, bilateral     Symmastia     Allergic contact dermatitis due to adhesives     Lumbar radiculopathy     Acute non intractable t

## 2021-04-29 ENCOUNTER — TELEPHONE (OUTPATIENT)
Dept: FAMILY MEDICINE CLINIC | Facility: CLINIC | Age: 41
End: 2021-04-29

## 2021-04-29 NOTE — TELEPHONE ENCOUNTER
----- Message from Chai Miller MD sent at 4/28/2021  6:48 PM CDT -----  Blood sugar was a little bit low at 69. Normal is 70 and above. Potassium is low at 3.1. Kidney function is normal.  CBC is normal.Impression: Potassium is a little bit low. Pl

## 2021-05-17 ENCOUNTER — HOSPITAL ENCOUNTER (OUTPATIENT)
Dept: GENERAL RADIOLOGY | Age: 41
Discharge: HOME | End: 2021-05-17
Attending: NURSE PRACTITIONER
Payer: COMMERCIAL

## 2021-05-17 ENCOUNTER — OFFICE VISIT (OUTPATIENT)
Dept: FAMILY MEDICINE CLINIC | Facility: CLINIC | Age: 41
End: 2021-05-17
Payer: COMMERCIAL

## 2021-05-17 ENCOUNTER — TELEPHONE (OUTPATIENT)
Dept: FAMILY MEDICINE CLINIC | Facility: CLINIC | Age: 41
End: 2021-05-17

## 2021-05-17 VITALS
RESPIRATION RATE: 16 BRPM | HEART RATE: 98 BPM | BODY MASS INDEX: 25.3 KG/M2 | WEIGHT: 137.5 LBS | DIASTOLIC BLOOD PRESSURE: 64 MMHG | HEIGHT: 62 IN | OXYGEN SATURATION: 100 % | SYSTOLIC BLOOD PRESSURE: 104 MMHG | TEMPERATURE: 98 F

## 2021-05-17 DIAGNOSIS — R07.81 RIB PAIN: ICD-10-CM

## 2021-05-17 DIAGNOSIS — J40 BRONCHITIS: ICD-10-CM

## 2021-05-17 DIAGNOSIS — J40 BRONCHITIS: Primary | ICD-10-CM

## 2021-05-17 PROCEDURE — 71101 X-RAY EXAM UNILAT RIBS/CHEST: CPT | Performed by: NURSE PRACTITIONER

## 2021-05-17 PROCEDURE — 99214 OFFICE O/P EST MOD 30 MIN: CPT | Performed by: NURSE PRACTITIONER

## 2021-05-17 PROCEDURE — 3074F SYST BP LT 130 MM HG: CPT | Performed by: NURSE PRACTITIONER

## 2021-05-17 PROCEDURE — 3078F DIAST BP <80 MM HG: CPT | Performed by: NURSE PRACTITIONER

## 2021-05-17 PROCEDURE — 3008F BODY MASS INDEX DOCD: CPT | Performed by: NURSE PRACTITIONER

## 2021-05-17 RX ORDER — PROMETHAZINE HYDROCHLORIDE AND CODEINE PHOSPHATE 6.25; 1 MG/5ML; MG/5ML
2.5 SYRUP ORAL EVERY 4 HOURS PRN
Qty: 240 ML | Refills: 0 | Status: SHIPPED | OUTPATIENT
Start: 2021-05-17

## 2021-05-17 RX ORDER — VALACYCLOVIR HYDROCHLORIDE 1 G/1
1 TABLET, FILM COATED ORAL 3 TIMES DAILY
Qty: 21 TABLET | Refills: 0 | Status: SHIPPED | OUTPATIENT
Start: 2021-05-17 | End: 2021-05-24

## 2021-05-17 RX ORDER — PREDNISONE 20 MG/1
TABLET ORAL
Qty: 24 TABLET | Refills: 0 | Status: SHIPPED | OUTPATIENT
Start: 2021-05-17

## 2021-05-17 RX ORDER — CEFDINIR 300 MG/1
300 CAPSULE ORAL 2 TIMES DAILY
COMMUNITY
Start: 2021-05-13

## 2021-05-17 RX ORDER — BENZONATATE 200 MG/1
200 CAPSULE ORAL AS DIRECTED
COMMUNITY
Start: 2021-05-13 | End: 2021-05-23

## 2021-05-17 NOTE — PATIENT INSTRUCTIONS
Rest, increase fluids, salt water gargles ,neti pot (use distilled water) or saline nasal spray, Mucinex-DM, follow up if symptoms persist or increase. Albuterol inhaler every 4 hrs as needed.      Will check chest x-ray with rib

## 2021-05-17 NOTE — TELEPHONE ENCOUNTER
----- Message from JUSTINA Lux sent at 5/17/2021  1:37 PM CDT -----  Please make sure patient receives my chart message as below-  Your chest x-ray shows no pneumonia, and no rib fracture-continue with treatment as discussed in the office. Thank charissa

## 2021-05-17 NOTE — PROGRESS NOTES
CHIEF COMPLAINT:   Patient presents with:  Cough: right side pain from cough/muscle      HPI:   Adriano Lyons is a 47year old female who presents to clinic today with complaints of urgent care-  Tested negative for covid -   Has been coughing - ha MOUTH DAILY 90 tablet 1   • ALPRAZOLAM 0.25 MG Oral Tab TAKE 1 TABLET(0.25 MG) BY MOUTH EVERY 6 HOURS AS NEEDED 30 tablet 0   • Ascorbic Acid (VITAMIN C) 1000 MG Oral Tab Take 1 tablet by mouth daily.      • Black Cohosh 540 MG Oral Cap Take 1 tablet by darcy wheezing. CARDIOVASCULAR: denies complaints   GI: No N/V/C/D.   NEURO: denies complaints    EXAM:   /64   Pulse 98   Temp 98.3 °F (36.8 °C)   Resp 16   Ht 5' 2\" (1.575 m)   Wt 137 lb 8 oz (62.4 kg)   LMP 12/07/2016 (Approximate)   SpO2 100%   BMI 2 sensitivity may be early shingles–recommend starting Valtrex as soon as possible.   Discussed with patient not to take burning with codeine with Norco as they are the same type of medication and can cause respiratory suppression–patient verbalized James Mcclain

## 2021-05-18 ENCOUNTER — PATIENT MESSAGE (OUTPATIENT)
Dept: FAMILY MEDICINE CLINIC | Facility: CLINIC | Age: 41
End: 2021-05-18

## 2021-05-18 NOTE — TELEPHONE ENCOUNTER
From: Pauline Drivers  To: JUSTINA Vieira  Sent: 5/18/2021 11:28 AM CDT  Subject: Non-Urgent Medical Question    my side and back worst feeling today . . i have taken my medication i have icy hot on my side and back im sitting at work and its asya

## 2021-05-19 ENCOUNTER — PATIENT MESSAGE (OUTPATIENT)
Dept: FAMILY MEDICINE CLINIC | Facility: CLINIC | Age: 41
End: 2021-05-19

## 2021-05-19 RX ORDER — BACLOFEN 10 MG/1
10 TABLET ORAL 3 TIMES DAILY PRN
Qty: 30 TABLET | Refills: 1 | Status: SHIPPED | OUTPATIENT
Start: 2021-05-19 | End: 2021-08-09

## 2021-05-19 NOTE — TELEPHONE ENCOUNTER
From: Pauline Feliz  To: JUSTINA Vieira  Sent: 5/19/2021 11:42 AM CDT  Subject: Other    I dont have a rash at all . .. still feel the same in side and my lower back . .. thinking mri is what i should have but im still trying to finish paying fo

## 2021-05-26 ENCOUNTER — PATIENT MESSAGE (OUTPATIENT)
Dept: FAMILY MEDICINE CLINIC | Facility: CLINIC | Age: 41
End: 2021-05-26

## 2021-05-26 DIAGNOSIS — G89.4 CHRONIC PAIN SYNDROME: ICD-10-CM

## 2021-05-26 RX ORDER — HYDROCODONE BITARTRATE AND ACETAMINOPHEN 5; 325 MG/1; MG/1
TABLET ORAL
Qty: 45 TABLET | Refills: 0 | Status: SHIPPED | OUTPATIENT
Start: 2021-05-26 | End: 2021-06-15

## 2021-05-26 NOTE — TELEPHONE ENCOUNTER
From: Brown Roach  To:  Mike Ricketts MD  Sent: 5/26/2021 8:42 AM CDT  Subject: Other    [facility]

## 2021-05-26 NOTE — TELEPHONE ENCOUNTER
Pt my chart message noted below. Pt last seen in office on 4/28/21. Norco refilled on 4/28/21 for #45.     Routed to MT.

## 2021-06-15 ENCOUNTER — PATIENT MESSAGE (OUTPATIENT)
Dept: FAMILY MEDICINE CLINIC | Facility: CLINIC | Age: 41
End: 2021-06-15

## 2021-06-15 DIAGNOSIS — G89.4 CHRONIC PAIN SYNDROME: ICD-10-CM

## 2021-06-15 RX ORDER — POTASSIUM CHLORIDE 750 MG/1
10 TABLET, FILM COATED, EXTENDED RELEASE ORAL 2 TIMES DAILY
Qty: 180 TABLET | Refills: 1 | Status: SHIPPED | OUTPATIENT
Start: 2021-06-15 | End: 2021-08-27

## 2021-06-15 RX ORDER — FUROSEMIDE 40 MG/1
40 TABLET ORAL DAILY
Qty: 90 TABLET | Refills: 1 | Status: SHIPPED | OUTPATIENT
Start: 2021-06-15

## 2021-06-15 RX ORDER — HYDROCODONE BITARTRATE AND ACETAMINOPHEN 5; 325 MG/1; MG/1
TABLET ORAL
Qty: 45 TABLET | Refills: 0 | Status: SHIPPED | OUTPATIENT
Start: 2021-06-15 | End: 2021-07-12

## 2021-06-15 NOTE — TELEPHONE ENCOUNTER
From: Leonor Hebert  To: Delores Thomas MD  Sent: 6/15/2021 8:40 AM CDT  Subject: Other    this is a first for me . . i cant find my waterpills or potassium and my norco in a situation like this what do i do? let me know if you call and i dont answ

## 2021-06-15 NOTE — TELEPHONE ENCOUNTER
Please advise MyChart Message and Medications to be refilled due to being misplaced.   Maritza Tucker, 06/15/21, 8:53 AM

## 2021-06-29 ENCOUNTER — OFFICE VISIT (OUTPATIENT)
Dept: FAMILY MEDICINE CLINIC | Facility: CLINIC | Age: 41
End: 2021-06-29
Payer: COMMERCIAL

## 2021-06-29 VITALS
RESPIRATION RATE: 16 BRPM | SYSTOLIC BLOOD PRESSURE: 112 MMHG | TEMPERATURE: 98 F | HEIGHT: 62 IN | BODY MASS INDEX: 24.63 KG/M2 | WEIGHT: 133.81 LBS | HEART RATE: 85 BPM | DIASTOLIC BLOOD PRESSURE: 70 MMHG | OXYGEN SATURATION: 100 %
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
## 2021-06-29 PROCEDURE — 99214 OFFICE O/P EST MOD 30 MIN: CPT | Performed by: NURSE PRACTITIONER

## 2021-06-29 PROCEDURE — 3008F BODY MASS INDEX DOCD: CPT | Performed by: NURSE PRACTITIONER

## 2021-06-29 PROCEDURE — 3078F DIAST BP <80 MM HG: CPT | Performed by: NURSE PRACTITIONER

## 2021-06-29 PROCEDURE — 90471 IMMUNIZATION ADMIN: CPT | Performed by: NURSE PRACTITIONER

## 2021-06-29 PROCEDURE — 90715 TDAP VACCINE 7 YRS/> IM: CPT | Performed by: NURSE PRACTITIONER

## 2021-06-29 PROCEDURE — 3074F SYST BP LT 130 MM HG: CPT | Performed by: NURSE PRACTITIONER

## 2021-06-29 RX ORDER — DOXYCYCLINE HYCLATE 100 MG/1
100 CAPSULE ORAL 2 TIMES DAILY
Qty: 20 CAPSULE | Refills: 0 | Status: SHIPPED | OUTPATIENT
Start: 2021-06-29 | End: 2021-07-09

## 2021-06-29 NOTE — PROGRESS NOTES
Pauline Feliz is a 47year old female.   Patient presents with:  Bite: both hands       HPI:   Patient picked up a stray cat and was attacked - last night - bites and scratches   Right thumb- 8-9 on 0-10  Scale, no discharge  Denies fever, denies body cefdinir 300 MG Oral Cap Take 300 mg by mouth 2 (two) times daily.      • predniSONE 20 MG Oral Tab 3 pills daily x 4 days, then 2 pills daily x 4 days, then 1 pill daily x 4 days 24 tablet 0   • promethazine-codeine 6.25-10 MG/5ML Oral Syrup Take 2.5 mL by bilateral mastectomy 2/8/2017   • Status post hysterectomy 3/8/2017   • [place] Duet Willebrand's disease [facility] 2/8/2017      Social History:  Social History    Tobacco Use      Smoking status: Former Smoker        Packs/day: 0.25        Types: Cigarettes        St bilaterally–right thumb–puncture wound near nail–very tender to touch thumb, some surrounding erythema and edema noted.   LYMPH:no enlarged lymph nodes to axilla bilaterally (history of mastectomy- no lymph nodes present to right axilla)   LUNGS: normal rat

## 2021-06-29 NOTE — PATIENT INSTRUCTIONS
Start Doxycycline twice a day for 10 days- take with food     Ice, elevate, and ibuprofen    Monitor wound-  If increased redness, red line going up your arm, fever, malaise, etc-  Go to the ER     Tetanus shot today (TDaP)

## 2021-06-30 ENCOUNTER — PATIENT MESSAGE (OUTPATIENT)
Dept: FAMILY MEDICINE CLINIC | Facility: CLINIC | Age: 41
End: 2021-06-30

## 2021-06-30 NOTE — TELEPHONE ENCOUNTER
From: Miya Sharma  To: JUSTINA Robison  Sent: 6/30/2021 9:09 AM CDT  Subject: Other    ok today my finger is Really Painful and the pocket is getting bigger im wondering if we cut it would that release the pressure its VERY uncomfortable

## 2021-07-12 ENCOUNTER — PATIENT MESSAGE (OUTPATIENT)
Dept: FAMILY MEDICINE CLINIC | Facility: CLINIC | Age: 41
End: 2021-07-12

## 2021-07-12 DIAGNOSIS — G89.4 CHRONIC PAIN SYNDROME: ICD-10-CM

## 2021-07-12 RX ORDER — HYDROCODONE BITARTRATE AND ACETAMINOPHEN 5; 325 MG/1; MG/1
TABLET ORAL
Qty: 45 TABLET | Refills: 0 | Status: SHIPPED | OUTPATIENT
Start: 2021-07-12 | End: 2021-08-09

## 2021-07-12 NOTE — TELEPHONE ENCOUNTER
Norco:  6/15/21    Future appt:     Your appointments     Date & Time Appointment Department [facility])    Aug 11, 2021  4:00 PM CDT Follow up Visit with Hansel Ernst MD [address] [place] [address], [facility] [place])    Con

## 2021-07-12 NOTE — TELEPHONE ENCOUNTER
From: Joanne Arroyo  To:  Warren Mcintyre MD  Sent: 7/12/2021 9:47 AM CDT  Subject: Other    refill norco to evonne xiong

## 2021-08-09 ENCOUNTER — PATIENT MESSAGE (OUTPATIENT)
Dept: FAMILY MEDICINE CLINIC | Facility: CLINIC | Age: 41
End: 2021-08-09

## 2021-08-09 DIAGNOSIS — G89.4 CHRONIC PAIN SYNDROME: ICD-10-CM

## 2021-08-09 RX ORDER — BACLOFEN 10 MG/1
10 TABLET ORAL 3 TIMES DAILY PRN
Qty: 30 TABLET | Refills: 0 | Status: SHIPPED | OUTPATIENT
Start: 2021-08-09 | End: 2021-08-27

## 2021-08-09 RX ORDER — HYDROCODONE BITARTRATE AND ACETAMINOPHEN 5; 325 MG/1; MG/1
TABLET ORAL
Qty: 45 TABLET | Refills: 0 | Status: SHIPPED | OUTPATIENT
Start: 2021-08-09 | End: 2021-08-20

## 2021-08-09 NOTE — TELEPHONE ENCOUNTER
From: Darlene Jacobsen  To:  Reid Baca MD  Sent: 8/9/2021 9:39 AM CDT  Subject: Other    refill muscle relaxer and norco to evonne xiong

## 2021-08-09 NOTE — TELEPHONE ENCOUNTER
Future appt:     Your appointments     Date & Time Appointment Department [facility])    Aug 27, 2021 11:00 AM CDT Follow up Visit with Joelle Davidson MD [facility], [facility]    Contact your primary c

## 2021-08-20 ENCOUNTER — OFFICE VISIT (OUTPATIENT)
Dept: FAMILY MEDICINE CLINIC | Facility: CLINIC | Age: 41
End: 2021-08-20
Payer: MEDICAID

## 2021-08-20 ENCOUNTER — TELEPHONE (OUTPATIENT)
Dept: FAMILY MEDICINE CLINIC | Facility: CLINIC | Age: 41
End: 2021-08-20

## 2021-08-20 VITALS
DIASTOLIC BLOOD PRESSURE: 78 MMHG | HEART RATE: 106 BPM | TEMPERATURE: 98 F | SYSTOLIC BLOOD PRESSURE: 118 MMHG | RESPIRATION RATE: 16 BRPM | WEIGHT: 131.63 LBS | BODY MASS INDEX: 24 KG/M2 | OXYGEN SATURATION: 99 %

## 2021-08-20 DIAGNOSIS — L08.9 INFECTED SEBACEOUS CYST: Primary | ICD-10-CM

## 2021-08-20 DIAGNOSIS — G89.4 CHRONIC PAIN SYNDROME: ICD-10-CM

## 2021-08-20 DIAGNOSIS — L72.3 INFECTED SEBACEOUS CYST: Primary | ICD-10-CM

## 2021-08-20 PROCEDURE — 87205 SMEAR GRAM STAIN: CPT | Performed by: NURSE PRACTITIONER

## 2021-08-20 PROCEDURE — 3078F DIAST BP <80 MM HG: CPT | Performed by: NURSE PRACTITIONER

## 2021-08-20 PROCEDURE — 10060 I&D ABSCESS SIMPLE/SINGLE: CPT | Performed by: NURSE PRACTITIONER

## 2021-08-20 PROCEDURE — 87186 SC STD MICRODIL/AGAR DIL: CPT | Performed by: NURSE PRACTITIONER

## 2021-08-20 PROCEDURE — 99213 OFFICE O/P EST LOW 20 MIN: CPT | Performed by: NURSE PRACTITIONER

## 2021-08-20 PROCEDURE — 87077 CULTURE AEROBIC IDENTIFY: CPT | Performed by: NURSE PRACTITIONER

## 2021-08-20 PROCEDURE — 87070 CULTURE OTHR SPECIMN AEROBIC: CPT | Performed by: NURSE PRACTITIONER

## 2021-08-20 PROCEDURE — 3074F SYST BP LT 130 MM HG: CPT | Performed by: NURSE PRACTITIONER

## 2021-08-20 RX ORDER — HYDROCODONE BITARTRATE AND ACETAMINOPHEN 5; 325 MG/1; MG/1
TABLET ORAL
Qty: 60 TABLET | Refills: 0 | Status: SHIPPED | OUTPATIENT
Start: 2021-08-20 | End: 2021-09-03

## 2021-08-20 RX ORDER — CLINDAMYCIN HYDROCHLORIDE 300 MG/1
300 CAPSULE ORAL 3 TIMES DAILY
Qty: 21 CAPSULE | Refills: 0 | Status: SHIPPED | OUTPATIENT
Start: 2021-08-20 | End: 2021-08-27

## 2021-08-20 NOTE — TELEPHONE ENCOUNTER
Her pain levels have been increased due to an infected sebaceous cyst.  Norco dose increased to 2/day. New prescription sent in for her.

## 2021-08-20 NOTE — PATIENT INSTRUCTIONS
Keep area clean and dry. Follow-up tomorrow for a recheck. Start clindamycin 3 times a day–take until gone. Follow-up if increased redness, pain, fever, red streak, etc.    Ibuprofen 600 mg 3 times a day as needed with food.

## 2021-08-20 NOTE — PROGRESS NOTES
Daryl García is a 47year old female. Patient presents with:  Lump: right armpit area for approx.  3 days      HPI:   Complaints of lump to right axilla - for 3 days -   No fever,   Pain getting worse - pain is an 8   Is tired,  Patient has not had FOR WHEEZING 8.5 g 1   • SERTRALINE HCL 50 MG Oral Tab TAKE 1 TABLET(50 MG) BY MOUTH DAILY 90 tablet 1   • ALPRAZOLAM 0.25 MG Oral Tab TAKE 1 TABLET(0.25 MG) BY MOUTH EVERY 6 HOURS AS NEEDED 30 tablet 0   • Ascorbic Acid (VITAMIN C) 1000 MG Oral Tab Take 1 cancer 10/6/2011   • Malignant neoplasm of breast (female) ([facility] 5/28/2008   • Osteoarthritis    • S/P bilateral mastectomy 2/8/2017   • Status post hysterectomy 3/8/2017   • Nan Kroner Willebrand's disease ([facility] 2/8/2017      Social History:  Social History    T (Approximate)   SpO2 99%   BMI 24.07 kg/m²   GENERAL: well developed, well nourished,in no apparent distress  SKIN: Right axilla–quarter sized, oval, raised, tender, red, abscess visible.   Area cleansed with Betadine Xylocaine with epinephrine injected to

## 2021-08-21 ENCOUNTER — OFFICE VISIT (OUTPATIENT)
Dept: FAMILY MEDICINE CLINIC | Facility: CLINIC | Age: 41
End: 2021-08-21
Payer: COMMERCIAL

## 2021-08-21 VITALS
HEIGHT: 62 IN | HEART RATE: 112 BPM | DIASTOLIC BLOOD PRESSURE: 70 MMHG | RESPIRATION RATE: 16 BRPM | SYSTOLIC BLOOD PRESSURE: 122 MMHG | BODY MASS INDEX: 24.18 KG/M2 | TEMPERATURE: 97 F | WEIGHT: 131.38 LBS | OXYGEN SATURATION: 96 %

## 2021-08-21 DIAGNOSIS — L08.9 INFECTED SEBACEOUS CYST: Primary | ICD-10-CM

## 2021-08-21 DIAGNOSIS — L72.3 INFECTED SEBACEOUS CYST: Primary | ICD-10-CM

## 2021-08-21 PROCEDURE — 3078F DIAST BP <80 MM HG: CPT | Performed by: NURSE PRACTITIONER

## 2021-08-21 PROCEDURE — 99024 POSTOP FOLLOW-UP VISIT: CPT | Performed by: NURSE PRACTITIONER

## 2021-08-21 PROCEDURE — 3074F SYST BP LT 130 MM HG: CPT | Performed by: NURSE PRACTITIONER

## 2021-08-21 PROCEDURE — 3008F BODY MASS INDEX DOCD: CPT | Performed by: NURSE PRACTITIONER

## 2021-08-21 NOTE — PATIENT INSTRUCTIONS
Keep wound covered with Telfa dressing–okay to wash with soap and water and pat dry follow-up if pain increases or redness increases

## 2021-08-21 NOTE — PROGRESS NOTES
Darek Patiño is a 47year old female. Patient presents with: Follow - Up: from yesterdays visit       HPI:   Patient presents today for a follow-up from incision and drainage to infected sebaceous cyst in right axilla.   Patient states that her ranjeet predniSONE 20 MG Oral Tab 3 pills daily x 4 days, then 2 pills daily x 4 days, then 1 pill daily x 4 days 24 tablet 0   • promethazine-codeine 6.25-10 MG/5ML Oral Syrup Take 2.5 mL by mouth every 4 (four) hours as needed for cough.  240 mL 0   • IBUPROFEN 8 Willebrand's disease ([facility] 2017      Social History:  Social History    Tobacco Use      Smoking status: Former Smoker        Packs/day: 0.25        Types: Cigarettes        Start date: 1999        Quit date: 2009        Years since quittin enlarged surrounding wound–smaller than yesterday–soft and mobile, less tender today.   LUNGS: normal rate without respiratory distress, lungs clear to auscultation  CARDIO: RRR without murmur, no edema      ASSESSMENT AND PLAN:     Infected sebaceous cyst

## 2021-08-23 ENCOUNTER — TELEPHONE (OUTPATIENT)
Dept: FAMILY MEDICINE CLINIC | Facility: CLINIC | Age: 41
End: 2021-08-23

## 2021-08-23 NOTE — TELEPHONE ENCOUNTER
----- Message from JUSTINA Santana sent at 8/22/2021 10:52 PM CDT -----  Please make sure patient receives my chart message as below  The culture of your wound shows that the infection is resistant to clindamycin-stop clindamycin and start Bactrim-pr

## 2021-08-27 ENCOUNTER — OFFICE VISIT (OUTPATIENT)
Dept: FAMILY MEDICINE CLINIC | Facility: CLINIC | Age: 41
End: 2021-08-27
Payer: MEDICAID

## 2021-08-27 VITALS
OXYGEN SATURATION: 97 % | HEART RATE: 89 BPM | HEIGHT: 62 IN | BODY MASS INDEX: 24.26 KG/M2 | TEMPERATURE: 98 F | SYSTOLIC BLOOD PRESSURE: 116 MMHG | WEIGHT: 131.81 LBS | RESPIRATION RATE: 16 BRPM | DIASTOLIC BLOOD PRESSURE: 70 MMHG

## 2021-08-27 DIAGNOSIS — Z15.01 GENETIC SUSCEPTIBILITY TO BREAST CANCER: ICD-10-CM

## 2021-08-27 DIAGNOSIS — F32.0 CURRENT MILD EPISODE OF MAJOR DEPRESSIVE DISORDER WITHOUT PRIOR EPISODE (HCC): ICD-10-CM

## 2021-08-27 DIAGNOSIS — G89.4 CHRONIC PAIN SYNDROME: Primary | ICD-10-CM

## 2021-08-27 DIAGNOSIS — M12.9 ARTHROPATHY: ICD-10-CM

## 2021-08-27 DIAGNOSIS — D68.0 TYPE 1 VON WILLEBRAND DISEASE (HCC): ICD-10-CM

## 2021-08-27 PROCEDURE — 99213 OFFICE O/P EST LOW 20 MIN: CPT | Performed by: FAMILY MEDICINE

## 2021-08-27 PROCEDURE — 3074F SYST BP LT 130 MM HG: CPT | Performed by: FAMILY MEDICINE

## 2021-08-27 PROCEDURE — 3008F BODY MASS INDEX DOCD: CPT | Performed by: FAMILY MEDICINE

## 2021-08-27 PROCEDURE — 3078F DIAST BP <80 MM HG: CPT | Performed by: FAMILY MEDICINE

## 2021-08-27 RX ORDER — POTASSIUM CHLORIDE 750 MG/1
10 TABLET, FILM COATED, EXTENDED RELEASE ORAL 2 TIMES DAILY
Qty: 180 TABLET | Refills: 1 | Status: SHIPPED | OUTPATIENT
Start: 2021-08-27

## 2021-08-27 RX ORDER — BACLOFEN 10 MG/1
10 TABLET ORAL 3 TIMES DAILY PRN
Qty: 30 TABLET | Refills: 3 | Status: SHIPPED | OUTPATIENT
Start: 2021-08-27

## 2021-08-27 NOTE — PROGRESS NOTES
[address]  PROGRESS NOTE  Chief Complaint:   Patient presents with: Follow - Up: med f/u and cyst under right arm near armpit      HPI:   This is a 47year old female coming in for medication follow-up. She has chronic pain.   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 180 tablet 1   • sulfamethoxazole-trimethoprim -160 MG Oral Tab per tablet Take 1 tablet by mouth 2 (two) times daily for 5 days.  10 tablet 0   • Dispersible Take 1 tablet by mouth daily. • Cholecalciferol (VITAMIN D) 1000 units Oral Tab Take 1 capsule by mouth daily. • cetirizine (ZYRTEC ALLERGY) 10 MG Oral Tab Take 1 tablet by mouth daily.         Counseling given: Not Answered       REVIEW groomed. Physical Exam:  GEN:  Patient is alert, awake and oriented, well developed, well nourished, no apparent distress.   HEENT:  Head:  Normocephalic, atraumatic Eyes: EOMI, PERRLA, no scleral icterus, conjunctivae clear bilaterally, no eye discharge E symptoms. Patient is to call with any side effects or complications from the treatments as a result of today.      Problem List:  Patient Active Problem List:     Anxiety state     Arthropathy     Vaginal high risk human papillomavirus (HPV) DNA test posit

## 2021-08-31 ENCOUNTER — PATIENT MESSAGE (OUTPATIENT)
Dept: FAMILY MEDICINE CLINIC | Facility: CLINIC | Age: 41
End: 2021-08-31

## 2021-08-31 DIAGNOSIS — L73.2 HYDRADENITIS: Primary | ICD-10-CM

## 2021-08-31 NOTE — TELEPHONE ENCOUNTER
From: David Naranjo  To:  Anshul Maddox MD  Sent: 8/31/2021 12:01 PM CDT  Subject: Referral Request    for surgeon for under arm pit  cant upload photo system timed out attachment to big

## 2021-08-31 NOTE — TELEPHONE ENCOUNTER
Patient with persistent problem with underarm  Cyst.  Now has 3 cysts under right arm.     Please advise surgeon referral.  Rebecca Jones, [address], 08/31/21, 12:38 PM

## 2021-09-03 ENCOUNTER — PATIENT MESSAGE (OUTPATIENT)
Dept: FAMILY MEDICINE CLINIC | Facility: CLINIC | Age: 41
End: 2021-09-03

## 2021-09-03 DIAGNOSIS — G89.4 CHRONIC PAIN SYNDROME: ICD-10-CM

## 2021-09-03 RX ORDER — HYDROCODONE BITARTRATE AND ACETAMINOPHEN 5; 325 MG/1; MG/1
TABLET ORAL
Qty: 90 TABLET | Refills: 0 | Status: SHIPPED | OUTPATIENT
Start: 2021-09-03 | End: 2021-10-06

## 2021-09-03 NOTE — TELEPHONE ENCOUNTER
From: Dustin Samayoa  To:  Carolyn Christina MD  Sent: 9/3/2021 3:27 PM CDT  Subject: Other    i am needing refill norco its early due to taking 2 due to armpit i am leaving for [place] til the end of the month then i see the surgeon for my armpit is t

## 2021-09-03 NOTE — TELEPHONE ENCOUNTER
Dr. Kennedi Gambino is wonderful and you will do fine seeing him. I will send in a refill for Norco early for you. I will change the dosing for you.

## 2021-10-05 ENCOUNTER — PATIENT MESSAGE (OUTPATIENT)
Dept: FAMILY MEDICINE CLINIC | Facility: CLINIC | Age: 41
End: 2021-10-05

## 2021-10-05 DIAGNOSIS — G89.4 CHRONIC PAIN SYNDROME: ICD-10-CM

## 2021-10-05 NOTE — TELEPHONE ENCOUNTER
From: Daryl García  To:  Madalyn Cox MD  Sent: 10/5/2021 3:17 PM CDT  Subject: med refill    refill norco to evonne xiong

## 2021-10-05 NOTE — TELEPHONE ENCOUNTER
Norco: 9/3/21    Future appt:    Last Appointment with provider:   8/27/2021  Last appointment at [facility]:  8/27/2021  No results found for: CHOLEST, HDL, LDL, TRIGLY, TRIG  No results found for: EAG, A1C  Lab Results   Component Value Date    T4F 0.9

## 2021-10-06 RX ORDER — HYDROCODONE BITARTRATE AND ACETAMINOPHEN 5; 325 MG/1; MG/1
TABLET ORAL
Qty: 90 TABLET | Refills: 0 | Status: SHIPPED | OUTPATIENT
Start: 2021-10-06 | End: 2021-11-12

## 2021-11-12 ENCOUNTER — PATIENT MESSAGE (OUTPATIENT)
Dept: FAMILY MEDICINE CLINIC | Facility: CLINIC | Age: 41
End: 2021-11-12

## 2021-11-12 DIAGNOSIS — G89.4 CHRONIC PAIN SYNDROME: ICD-10-CM

## 2021-11-12 DIAGNOSIS — L73.2 HYDRADENITIS: Primary | ICD-10-CM

## 2021-11-12 RX ORDER — HYDROCODONE BITARTRATE AND ACETAMINOPHEN 5; 325 MG/1; MG/1
TABLET ORAL
Qty: 90 TABLET | Refills: 0 | Status: SHIPPED | OUTPATIENT
Start: 2021-11-12 | End: 2021-12-03

## 2021-11-12 NOTE — TELEPHONE ENCOUNTER
Please advise:  Pt seen 8/27/21 for cyst. Patient had new insurance and needs new referral for monfils.

## 2021-11-12 NOTE — TELEPHONE ENCOUNTER
From: Concetta Sutton  To:  Mirtha Rivas MD  Sent: 11/12/2021 8:48 AM CST  Subject: med and referral    i have new insurance now medicaid [employer] i need a referral to dr Reanna Hendrix for my armpit to remove the cyst     refill norco to eveline

## 2021-11-12 NOTE — TELEPHONE ENCOUNTER
I will refill Norco.  I do not think Dr. Juanjo Varlea is covered under [facility] but I will send in a referral.

## 2021-12-03 ENCOUNTER — PATIENT MESSAGE (OUTPATIENT)
Dept: FAMILY MEDICINE CLINIC | Facility: CLINIC | Age: 41
End: 2021-12-03

## 2021-12-03 DIAGNOSIS — G89.4 CHRONIC PAIN SYNDROME: ICD-10-CM

## 2021-12-03 RX ORDER — DIAZEPAM 10 MG/1
10 TABLET ORAL
Qty: 1 TABLET | Refills: 0 | Status: SHIPPED | OUTPATIENT
Start: 2021-12-03 | End: 2021-12-03

## 2021-12-03 RX ORDER — HYDROCODONE BITARTRATE AND ACETAMINOPHEN 5; 325 MG/1; MG/1
TABLET ORAL
Qty: 90 TABLET | Refills: 0 | Status: SHIPPED | OUTPATIENT
Start: 2021-12-03 | End: 2021-12-18

## 2021-12-03 RX ORDER — IBUPROFEN 800 MG/1
800 TABLET ORAL EVERY 8 HOURS PRN
Qty: 30 TABLET | Refills: 1 | Status: SHIPPED | OUTPATIENT
Start: 2021-12-03

## 2021-12-03 NOTE — TELEPHONE ENCOUNTER
From: Alvina Ray  To:  Nanda Pan MD  Sent: 12/3/2021 11:45 AM CST  Subject: med for mri     i am having mri done monday needing medicine again for machine   i do have my follow up scheduled for jan   will be leaving wed for bday to [facility]

## 2021-12-04 ENCOUNTER — PATIENT MESSAGE (OUTPATIENT)
Dept: FAMILY MEDICINE CLINIC | Facility: CLINIC | Age: 41
End: 2021-12-04

## 2021-12-04 NOTE — TELEPHONE ENCOUNTER
Insurance paying for 7 day Rx for Norco only.   Evelyn Figueroa, [facility], 12/04/21, 12:23 PM

## 2021-12-04 NOTE — TELEPHONE ENCOUNTER
From: Bernadine Solis  To:  Erickson Latif MD  Sent: 12/4/2021 9:48 AM CST  Subject: med for mri     pharmacy said there was some issues with medication for mri meds and norco which i know its due friday but will gone i leave wed and   having mri mon

## 2021-12-08 ENCOUNTER — PATIENT MESSAGE (OUTPATIENT)
Dept: FAMILY MEDICINE CLINIC | Facility: CLINIC | Age: 41
End: 2021-12-08

## 2021-12-08 DIAGNOSIS — G89.4 CHRONIC PAIN SYNDROME: ICD-10-CM

## 2021-12-09 NOTE — TELEPHONE ENCOUNTER
From: Daryl García  Sent: 12/8/2021 3:04 PM CST  To: Jose C Sidhu Clinical Staff  Subject: med for mri     i have spoke to my insurance company and they are needing prior authorization from dr office  also having another mri 12/21 on abdomen will ne

## 2021-12-10 ENCOUNTER — PATIENT MESSAGE (OUTPATIENT)
Dept: FAMILY MEDICINE CLINIC | Facility: CLINIC | Age: 41
End: 2021-12-10

## 2021-12-10 RX ORDER — ALBUTEROL SULFATE 90 UG/1
2 AEROSOL, METERED RESPIRATORY (INHALATION) EVERY 4 HOURS PRN
Qty: 8.5 G | Refills: 3 | Status: SHIPPED | OUTPATIENT
Start: 2021-12-10

## 2021-12-10 NOTE — TELEPHONE ENCOUNTER
From: Joanne Arroyo  To:  Warren Mcintyre MD  Sent: 12/10/2021 12:17 PM CST  Subject: covid positive     well my trip is canceled i did a home test wed night tested positive with covid   i have had a fever 100.3 hasnt gone more than that when i take

## 2021-12-10 NOTE — TELEPHONE ENCOUNTER
I am so sorry that you now have Covid. I do recommend refilling the inhaler. Albuterol has been shown to be helpful with the cough. Please have someone go out and get an oximeter. Knowing your oxygen level can be helpful.   If the oxygen level drops

## 2021-12-13 RX ORDER — DIAZEPAM 10 MG/1
10 TABLET ORAL
Qty: 1 TABLET | Refills: 0 | Status: SHIPPED | OUTPATIENT
Start: 2021-12-13 | End: 2021-12-13

## 2021-12-13 NOTE — TELEPHONE ENCOUNTER
I will send in another prescription for Valium.     We will contact the insurance company to see what we need to do to get the prior authorization for the [address] [facility] [facility].

## 2021-12-13 NOTE — TELEPHONE ENCOUNTER
Called Jordy to get correct contact to initiate Prior Authorization  Called contact based on [employer] #[phone]. Was told that pt insurance became inactive on 7/31/21.   Pt will need to contact insurance to discuss- may be why Prior Authoriz

## 2021-12-17 ENCOUNTER — PATIENT MESSAGE (OUTPATIENT)
Dept: FAMILY MEDICINE CLINIC | Facility: CLINIC | Age: 41
End: 2021-12-17

## 2021-12-18 RX ORDER — HYDROCODONE BITARTRATE AND ACETAMINOPHEN 5; 325 MG/1; MG/1
TABLET ORAL
Qty: 21 TABLET | Refills: 0 | Status: SHIPPED | OUTPATIENT
Start: 2021-12-18 | End: 2021-12-28

## 2021-12-20 NOTE — TELEPHONE ENCOUNTER
From: Miya Sharma  To:  Kerri Pitts MD  Sent: 12/17/2021 1:33 PM CST  Subject: med     checking on prior auth for norco ? i am out of medication

## 2021-12-23 ENCOUNTER — PATIENT MESSAGE (OUTPATIENT)
Dept: FAMILY MEDICINE CLINIC | Facility: CLINIC | Age: 41
End: 2021-12-23

## 2021-12-28 ENCOUNTER — TELEPHONE (OUTPATIENT)
Dept: FAMILY MEDICINE CLINIC | Facility: CLINIC | Age: 41
End: 2021-12-28

## 2021-12-28 ENCOUNTER — PATIENT MESSAGE (OUTPATIENT)
Dept: FAMILY MEDICINE CLINIC | Facility: CLINIC | Age: 41
End: 2021-12-28

## 2021-12-28 DIAGNOSIS — G89.4 CHRONIC PAIN SYNDROME: ICD-10-CM

## 2021-12-28 RX ORDER — HYDROCODONE BITARTRATE AND ACETAMINOPHEN 5; 325 MG/1; MG/1
TABLET ORAL
Qty: 21 TABLET | Refills: 0 | Status: SHIPPED | OUTPATIENT
Start: 2021-12-28 | End: 2022-01-07

## 2021-12-28 NOTE — TELEPHONE ENCOUNTER
Requesting last office note faxed for the  Norco PriorAuthorization/done. 8/27/21 OV faxed.   Mayank Ram CMA, 12/28/21, 4:40 PM

## 2021-12-28 NOTE — TELEPHONE ENCOUNTER
Norco: 12/18/21  7 day refill. Evelyn Figueroa CMA, 12/28/21, 3:16 PM    Future appt:     Your appointments     Date & Time Appointment Department [facility])    Jan 17, 2022  1:30 PM CST Follow up Visit with Marisol Payne MD [place] [address], [facility]

## 2021-12-28 NOTE — TELEPHONE ENCOUNTER
From: Raissa Singh  To:  Louise Rey MD  Sent: 12/17/2021 1:33 PM CST  Subject: med     checking on prior auth for norco ? i am out of medication

## 2021-12-28 NOTE — TELEPHONE ENCOUNTER
Insurance will only cover 7 days of Norco at time without a prior authorization for Quantity Override.

## 2021-12-28 NOTE — TELEPHONE ENCOUNTER
From: Aparna Vasquez  To:  Jonathan Tong MD  Sent: 12/17/2021 1:33 PM CST  Subject: med     checking on prior auth for norco ? i am out of medication

## 2022-01-07 ENCOUNTER — PATIENT MESSAGE (OUTPATIENT)
Dept: FAMILY MEDICINE CLINIC | Facility: CLINIC | Age: 42
End: 2022-01-07

## 2022-01-07 DIAGNOSIS — G89.4 CHRONIC PAIN SYNDROME: ICD-10-CM

## 2022-01-07 RX ORDER — HYDROCODONE BITARTRATE AND ACETAMINOPHEN 5; 325 MG/1; MG/1
TABLET ORAL
Qty: 90 TABLET | Refills: 0 | Status: SHIPPED | OUTPATIENT
Start: 2022-01-07 | End: 2022-02-07

## 2022-01-07 NOTE — TELEPHONE ENCOUNTER
Norco/Inhaler. Chandan Alberto CMA, 01/07/22, 9:35 AM      Future appt:     Your appointments     Date & Time Appointment Department [facility])    Jan 17, 2022  1:30 PM CST Follow up Visit with Lionel Quan MD [address] [place] [address]

## 2022-01-07 NOTE — TELEPHONE ENCOUNTER
From: Concha Pearce  To:  Khari Zaldivar MD  Sent: 1/7/2022 9:20 AM CST  Subject: Med    Need refill of norco and inhaler

## 2022-02-07 ENCOUNTER — PATIENT MESSAGE (OUTPATIENT)
Dept: FAMILY MEDICINE CLINIC | Facility: CLINIC | Age: 42
End: 2022-02-07

## 2022-02-07 RX ORDER — HYDROCODONE BITARTRATE AND ACETAMINOPHEN 5; 325 MG/1; MG/1
TABLET ORAL
Qty: 90 TABLET | Refills: 0 | Status: SHIPPED | OUTPATIENT
Start: 2022-02-07 | End: 2022-03-07

## 2022-02-07 NOTE — TELEPHONE ENCOUNTER
From: Denver Burton  To:  Bailey Barbosa MD  Sent: 2/7/2022 12:57 PM CST  Subject: med refill     medication refill   norco [facility]

## 2022-02-07 NOTE — TELEPHONE ENCOUNTER
Norco: 1/7/22    Future appt: Your appointments     Date & Time Appointment Department [facility])    Feb 22, 2022 11:15 AM CST Follow up Visit with Dinesh Eli MD [address] [place] [address] ([place])    Contact your primary care provider if your insurance requires a referral.    Please arrive 15 minutes prior to your scheduled appointment. Be sure to bring your current Insurance card, Photo ID, and medication bottles or a list of your current medications. A 24 hour notice is required to cancel any appointment or you may be charged a $40 No Show Fee. Important: 24 hour notice is required to cancel any appointment or you may be charged a $40 No Show Fee. Please notify your physician office. [address] [place] [address] [place]  [facility] 1076 [address]  [phone]        Last Appointment with provider:   8/27/2021  Last appointment at [facility]:  8/27/2021  No results found for: CHOLEST, HDL, LDL, TRIGLY, TRIG  No results found for: EAG, A1C  Lab Results   Component Value Date    T4F 0.9 03/11/2021    TSH 0.953 03/11/2021       No follow-ups on file.

## 2022-02-22 ENCOUNTER — HOSPITAL ENCOUNTER (OUTPATIENT)
Dept: GENERAL RADIOLOGY | Age: 42
Discharge: HOME | End: 2022-02-22
Attending: FAMILY MEDICINE
Payer: MEDICAID

## 2022-02-22 ENCOUNTER — OFFICE VISIT (OUTPATIENT)
Dept: FAMILY MEDICINE CLINIC | Facility: CLINIC | Age: 42
End: 2022-02-22
Payer: MEDICAID

## 2022-02-22 VITALS
DIASTOLIC BLOOD PRESSURE: 72 MMHG | WEIGHT: 126 LBS | TEMPERATURE: 99 F | HEIGHT: 62 IN | RESPIRATION RATE: 18 BRPM | OXYGEN SATURATION: 99 % | BODY MASS INDEX: 23.19 KG/M2 | HEART RATE: 101 BPM | SYSTOLIC BLOOD PRESSURE: 124 MMHG

## 2022-02-22 DIAGNOSIS — G89.4 CHRONIC PAIN SYNDROME: ICD-10-CM

## 2022-02-22 DIAGNOSIS — R07.81 RIB PAIN ON LEFT SIDE: Primary | ICD-10-CM

## 2022-02-22 DIAGNOSIS — R07.81 RIB PAIN ON LEFT SIDE: ICD-10-CM

## 2022-02-22 DIAGNOSIS — F32.0 CURRENT MILD EPISODE OF MAJOR DEPRESSIVE DISORDER WITHOUT PRIOR EPISODE (HCC): ICD-10-CM

## 2022-02-22 PROCEDURE — 71100 X-RAY EXAM RIBS UNI 2 VIEWS: CPT | Performed by: FAMILY MEDICINE

## 2022-02-22 PROCEDURE — 3008F BODY MASS INDEX DOCD: CPT | Performed by: FAMILY MEDICINE

## 2022-02-22 PROCEDURE — 3078F DIAST BP <80 MM HG: CPT | Performed by: FAMILY MEDICINE

## 2022-02-22 PROCEDURE — 3074F SYST BP LT 130 MM HG: CPT | Performed by: FAMILY MEDICINE

## 2022-02-22 PROCEDURE — 99214 OFFICE O/P EST MOD 30 MIN: CPT | Performed by: FAMILY MEDICINE

## 2022-02-22 RX ORDER — ESTRADIOL 0.05 MG/D
1 FILM, EXTENDED RELEASE TRANSDERMAL
COMMUNITY
Start: 2022-02-12

## 2022-02-28 RX ORDER — POTASSIUM CHLORIDE 750 MG/1
TABLET, FILM COATED, EXTENDED RELEASE ORAL
Qty: 180 TABLET | Refills: 1 | Status: SHIPPED | OUTPATIENT
Start: 2022-02-28

## 2022-02-28 RX ORDER — FUROSEMIDE 40 MG/1
TABLET ORAL
Qty: 90 TABLET | Refills: 1 | Status: SHIPPED | OUTPATIENT
Start: 2022-02-28

## 2022-02-28 NOTE — TELEPHONE ENCOUNTER
Future appt: Your appointments     Date & Time Appointment Department [facility])    Sep 14, 2022 10:30 AM CDT Physical - Established with Channing Elizalde MD [address] [facility] ([facility])            25 [facility] 1076 [address]  [phone]        Last Appointment with provider:   2/22/2022 for follow up, rib pain. Return in 6 months. [place] [facility]  2/22/2022  No results found for: CHOLEST, HDL, LDL, TRIGLY, TRIG  No results found for: EAG, A1C  Lab Results   Component Value Date    T4F 0.9 03/11/2021    TSH 0.953 03/11/2021       No follow-ups on file.

## 2022-03-05 ENCOUNTER — PATIENT MESSAGE (OUTPATIENT)
Dept: FAMILY MEDICINE CLINIC | Facility: CLINIC | Age: 42
End: 2022-03-05

## 2022-03-07 RX ORDER — HYDROCODONE BITARTRATE AND ACETAMINOPHEN 5; 325 MG/1; MG/1
TABLET ORAL
Qty: 90 TABLET | Refills: 0 | Status: SHIPPED | OUTPATIENT
Start: 2022-03-07

## 2022-03-07 NOTE — TELEPHONE ENCOUNTER
Norco:  2/7/22    Future appt: Your appointments     Date & Time Appointment Department [facility])    Sep 14, 2022 10:30 AM CDT Physical - Established with Sandra Dutta MD 25 [facility] [place] ([place])            25 [facility]  [facility] 1076 [address]  [phone]        Last Appointment with provider:   2/22/2022  Last appointment at [facility]:  2/22/2022  No results found for: CHOLEST, HDL, LDL, TRIGLY, TRIG  No results found for: EAG, A1C  Lab Results   Component Value Date    T4F 0.9 03/11/2021    TSH 0.953 03/11/2021       No follow-ups on file.

## 2022-03-07 NOTE — TELEPHONE ENCOUNTER
From: Philip Lowe  To:  Matthew Loera MD  Sent: 3/5/2022 10:48 AM CST  Subject: Med     Norco to [employer]

## 2022-03-14 ENCOUNTER — TELEPHONE (OUTPATIENT)
Dept: FAMILY MEDICINE CLINIC | Facility: CLINIC | Age: 42
End: 2022-03-14

## 2022-03-14 NOTE — TELEPHONE ENCOUNTER
PreOp Appt given. Courtneymargaret LAN Garcia, 03/14/22, 3:50 PM    Future appt: Your appointments     Date & Time Appointment Department [facility])    Apr 15, 2022 11:15 AM CDT Presurgical Visit with Andrew Lozano MD [address] [place] [address] ([facility])        Sep 14, 2022 10:30 AM CDT Physical - Established with Andrew Lozano MD [address] [place] [address] ([place])            [address] [place] [address] [place]  [facility] 1076 [address]  [phone]        Last Appointment with provider:   2/22/2022  Last appointment at [facility]:  2/22/2022  No results found for: CHOLEST, HDL, LDL, TRIGLY, TRIG  No results found for: EAG, A1C  Lab Results   Component Value Date    T4F 0.9 03/11/2021    TSH 0.953 03/11/2021       No follow-ups on file.

## 2022-03-28 NOTE — TELEPHONE ENCOUNTER
From: Olga Alvarez  To:  Tiara Herman MD  Sent: 6/10/2019 12:31 PM CDT  Subject: Prescription Question    norco 7.5 refill
Future appt:     Your appointments     Date & Time Appointment Department [facility])    Aug 14, 2019 11:30 AM CDT Follow up with Hansel Ernst MD 25 [facility] ([place])            [facility]
No

## 2022-04-06 ENCOUNTER — PATIENT MESSAGE (OUTPATIENT)
Dept: FAMILY MEDICINE CLINIC | Facility: CLINIC | Age: 42
End: 2022-04-06

## 2022-04-06 RX ORDER — HYDROCODONE BITARTRATE AND ACETAMINOPHEN 5; 325 MG/1; MG/1
TABLET ORAL
Qty: 90 TABLET | Refills: 0 | Status: SHIPPED | OUTPATIENT
Start: 2022-04-06

## 2022-04-06 NOTE — TELEPHONE ENCOUNTER
From: Jimi Sparks  To:  Felisha Ordonez MD  Sent: 4/6/2022 11:52 AM CDT  Subject: Med     Refill [place] to [employer]

## 2022-04-06 NOTE — TELEPHONE ENCOUNTER
Norco: 3/7/22     Return in about 6 months (around 8/22/2022). Future appt: Your appointments     Date & Time Appointment Department [facility])    Apr 15, 2022 11:15 AM CDT [facility] with [facility]        Sep 14, 2022 10:30 AM CDT Physical - Established with Kevin Perez MD [address] [place] [address] ([facility])            25 [facility] [place]  [facility] 1076 [address]  [phone]        Last Appointment with provider:   2/22/2022  Last appointment at [facility] [place]:  2/22/2022  No results found for: CHOLEST, HDL, LDL, TRIGLY, TRIG  No results found for: EAG, A1C  Lab Results   Component Value Date    T4F 0.9 03/11/2021    TSH 0.953 03/11/2021       No follow-ups on file.

## 2022-04-14 NOTE — TELEPHONE ENCOUNTER
----- Message from KATIE Boyce sent at 4/20/2017  8:28 AM CDT -----  Please notify patient that her blood work is essentially normal, however her potassium is just slightly low, I would recommend she take an extra dose of potassium daily while she
Patient informed of results. Expressed understanding.
pulse oximetry

## 2022-04-15 ENCOUNTER — LABORATORY ENCOUNTER (OUTPATIENT)
Dept: LAB | Age: 42
End: 2022-04-15
Attending: FAMILY MEDICINE
Payer: MEDICAID

## 2022-04-15 ENCOUNTER — OFFICE VISIT (OUTPATIENT)
Dept: FAMILY MEDICINE CLINIC | Facility: CLINIC | Age: 42
End: 2022-04-15
Payer: MEDICAID

## 2022-04-15 VITALS
DIASTOLIC BLOOD PRESSURE: 66 MMHG | RESPIRATION RATE: 16 BRPM | HEIGHT: 62 IN | WEIGHT: 131.38 LBS | TEMPERATURE: 98 F | HEART RATE: 96 BPM | BODY MASS INDEX: 24.18 KG/M2 | SYSTOLIC BLOOD PRESSURE: 118 MMHG

## 2022-04-15 DIAGNOSIS — Z01.818 PREOP EXAMINATION: ICD-10-CM

## 2022-04-15 DIAGNOSIS — Z90.13 S/P BILATERAL MASTECTOMY: ICD-10-CM

## 2022-04-15 DIAGNOSIS — Z98.890 S/P BREAST RECONSTRUCTION, BILATERAL: ICD-10-CM

## 2022-04-15 DIAGNOSIS — F32.0 CURRENT MILD EPISODE OF MAJOR DEPRESSIVE DISORDER WITHOUT PRIOR EPISODE (HCC): ICD-10-CM

## 2022-04-15 DIAGNOSIS — R07.81 RIB PAIN ON LEFT SIDE: Primary | ICD-10-CM

## 2022-04-15 DIAGNOSIS — E87.6 HYPOKALEMIA: ICD-10-CM

## 2022-04-15 LAB
ALBUMIN SERPL-MCNC: 4.4 G/DL (ref 3.4–5)
ALBUMIN/GLOB SERPL: 1.6 {RATIO} (ref 1–2)
ALP LIVER SERPL-CCNC: 72 U/L
ALT SERPL-CCNC: 22 U/L
ANION GAP SERPL CALC-SCNC: 4 MMOL/L (ref 0–18)
APTT PPP: 35.6 SECONDS (ref 23.3–35.6)
AST SERPL-CCNC: 16 U/L (ref 15–37)
BASOPHILS # BLD AUTO: 0.07 X10(3) UL (ref 0–0.2)
BASOPHILS NFR BLD AUTO: 1 %
BILIRUB SERPL-MCNC: 0.6 MG/DL (ref 0.1–2)
BUN BLD-MCNC: 9 MG/DL (ref 7–18)
CALCIUM BLD-MCNC: 9.2 MG/DL (ref 8.5–10.1)
CHLORIDE SERPL-SCNC: 109 MMOL/L (ref 98–112)
CO2 SERPL-SCNC: 28 MMOL/L (ref 21–32)
CREAT BLD-MCNC: 0.68 MG/DL
EOSINOPHIL # BLD AUTO: 0.14 X10(3) UL (ref 0–0.7)
EOSINOPHIL NFR BLD AUTO: 2 %
ERYTHROCYTE [DISTWIDTH] IN BLOOD BY AUTOMATED COUNT: 13 %
FASTING STATUS PATIENT QL REPORTED: NO
GLOBULIN PLAS-MCNC: 2.7 G/DL (ref 2.8–4.4)
GLUCOSE BLD-MCNC: 87 MG/DL (ref 70–99)
HCT VFR BLD AUTO: 39 %
HGB BLD-MCNC: 13.4 G/DL
IMM GRANULOCYTES # BLD AUTO: 0.02 X10(3) UL (ref 0–1)
IMM GRANULOCYTES NFR BLD: 0.3 %
INR BLD: 1.01 (ref 0.8–1.2)
LYMPHOCYTES # BLD AUTO: 2.75 X10(3) UL (ref 1–4)
LYMPHOCYTES NFR BLD AUTO: 38.7 %
MCH RBC QN AUTO: 31.6 PG (ref 26–34)
MCHC RBC AUTO-ENTMCNC: 34.4 G/DL (ref 31–37)
MCV RBC AUTO: 92 FL
MONOCYTES # BLD AUTO: 0.53 X10(3) UL (ref 0.1–1)
MONOCYTES NFR BLD AUTO: 7.5 %
NEUTROPHILS # BLD AUTO: 3.59 X10 (3) UL (ref 1.5–7.7)
NEUTROPHILS # BLD AUTO: 3.59 X10(3) UL (ref 1.5–7.7)
NEUTROPHILS NFR BLD AUTO: 50.5 %
OSMOLALITY SERPL CALC.SUM OF ELEC: 290 MOSM/KG (ref 275–295)
PLATELET # BLD AUTO: 259 10(3)UL (ref 150–450)
POTASSIUM SERPL-SCNC: 3.9 MMOL/L (ref 3.5–5.1)
PROT SERPL-MCNC: 7.1 G/DL (ref 6.4–8.2)
PROTHROMBIN TIME: 13.3 SECONDS (ref 11.6–14.8)
RBC # BLD AUTO: 4.24 X10(6)UL
SODIUM SERPL-SCNC: 141 MMOL/L (ref 136–145)
WBC # BLD AUTO: 7.1 X10(3) UL (ref 4–11)

## 2022-04-15 PROCEDURE — 3074F SYST BP LT 130 MM HG: CPT | Performed by: FAMILY MEDICINE

## 2022-04-15 PROCEDURE — 85025 COMPLETE CBC W/AUTO DIFF WBC: CPT | Performed by: FAMILY MEDICINE

## 2022-04-15 PROCEDURE — 85610 PROTHROMBIN TIME: CPT | Performed by: FAMILY MEDICINE

## 2022-04-15 PROCEDURE — 99214 OFFICE O/P EST MOD 30 MIN: CPT | Performed by: FAMILY MEDICINE

## 2022-04-15 PROCEDURE — 85730 THROMBOPLASTIN TIME PARTIAL: CPT | Performed by: FAMILY MEDICINE

## 2022-04-15 PROCEDURE — 3008F BODY MASS INDEX DOCD: CPT | Performed by: FAMILY MEDICINE

## 2022-04-15 PROCEDURE — 80053 COMPREHEN METABOLIC PANEL: CPT | Performed by: FAMILY MEDICINE

## 2022-04-15 PROCEDURE — 3078F DIAST BP <80 MM HG: CPT | Performed by: FAMILY MEDICINE

## 2022-04-15 PROCEDURE — 36415 COLL VENOUS BLD VENIPUNCTURE: CPT | Performed by: FAMILY MEDICINE

## 2022-04-15 RX ORDER — DESMOPRESSIN ACETATE 0.1 MG/ML
SOLUTION NASAL
COMMUNITY
Start: 2022-03-11

## 2022-04-15 NOTE — PATIENT INSTRUCTIONS
Medically clear for surgery. We are unable to perform type and screen here since surgery will be completed at [facility]. Continue the same medications.

## 2022-04-18 ENCOUNTER — TELEPHONE (OUTPATIENT)
Dept: FAMILY MEDICINE CLINIC | Facility: CLINIC | Age: 42
End: 2022-04-18

## 2022-04-18 NOTE — TELEPHONE ENCOUNTER
----- Message from Khari Zaldivar MD sent at 4/18/2022  9:03 AM CDT -----  The preoperative lab testing is all completely normal.Impression: Medically clear for surgery.

## 2022-04-18 NOTE — TELEPHONE ENCOUNTER
All PreOp Information has been faxed. Patient has viewed Lab Results/'s information on [employer].   Savannah Otero CMA, 04/18/22, 1:54 PM

## 2022-04-19 ENCOUNTER — PATIENT MESSAGE (OUTPATIENT)
Dept: FAMILY MEDICINE CLINIC | Facility: CLINIC | Age: 42
End: 2022-04-19

## 2022-05-04 ENCOUNTER — PATIENT MESSAGE (OUTPATIENT)
Dept: FAMILY MEDICINE CLINIC | Facility: CLINIC | Age: 42
End: 2022-05-04

## 2022-05-04 RX ORDER — HYDROCODONE BITARTRATE AND ACETAMINOPHEN 5; 325 MG/1; MG/1
TABLET ORAL
Qty: 90 TABLET | Refills: 0 | Status: SHIPPED | OUTPATIENT
Start: 2022-05-04

## 2022-06-01 ENCOUNTER — PATIENT MESSAGE (OUTPATIENT)
Dept: FAMILY MEDICINE CLINIC | Facility: CLINIC | Age: 42
End: 2022-06-01

## 2022-06-01 DIAGNOSIS — G89.4 CHRONIC PAIN SYNDROME: ICD-10-CM

## 2022-06-01 RX ORDER — HYDROCODONE BITARTRATE AND ACETAMINOPHEN 5; 325 MG/1; MG/1
TABLET ORAL
Qty: 90 TABLET | Refills: 0 | Status: SHIPPED | OUTPATIENT
Start: 2022-06-01

## 2022-06-01 RX ORDER — BACLOFEN 10 MG/1
TABLET ORAL
Qty: 30 TABLET | Refills: 3 | Status: SHIPPED | OUTPATIENT
Start: 2022-06-01

## 2022-06-01 NOTE — TELEPHONE ENCOUNTER
Future appt: Your appointments     Date & Time Appointment Department [facility])    Sep 14, 2022 10:30 AM CDT Physical - Established with Liliane Palmer MD [address] [place] [address] ([facility])            [address] [place] [address] Zina  [facility] 1076 [address]  [phone]          Last refill - 5/4/22 - qty 80    Last Appointment with provider:   4/15/2022 - pre-op  [facility] appointment at [facility]:  4/15/2022      No results found for: CHOLEST, HDL, LDL, TRIGLY, TRIG  No results found for: EAG, A1C  Lab Results   Component Value Date    T4F 0.9 03/11/2021    TSH 0.953 03/11/2021       No follow-ups on file.

## 2022-06-01 NOTE — TELEPHONE ENCOUNTER
Future appt: Your appointments     Date & Time Appointment Department [facility])    Sep 14, 2022 10:30 AM CDT Physical - Established with Caroline August MD [facility]            25 [facility] 1076 [address]  [phone]        Last Appointment with provider:   Visit date not found  [facility]:  4/15/2022      Baclofen refilled per MT on 8/27/21 for #30, 3 refills  No results found for: CHOLEST, HDL, LDL, TRIGLY, TRIG  No results found for: EAG, A1C  Lab Results   Component Value Date    T4F 0.9 03/11/2021    TSH 0.953 03/11/2021       No follow-ups on file.

## 2022-06-29 ENCOUNTER — PATIENT MESSAGE (OUTPATIENT)
Dept: FAMILY MEDICINE CLINIC | Facility: CLINIC | Age: 42
End: 2022-06-29

## 2022-06-29 DIAGNOSIS — G89.4 CHRONIC PAIN SYNDROME: ICD-10-CM

## 2022-06-29 RX ORDER — HYDROCODONE BITARTRATE AND ACETAMINOPHEN 5; 325 MG/1; MG/1
TABLET ORAL
Qty: 90 TABLET | Refills: 0 | Status: SHIPPED | OUTPATIENT
Start: 2022-06-29

## 2022-06-29 NOTE — TELEPHONE ENCOUNTER
From: Brooke Medina  To: Renetta Carty MD  Sent: 6/29/2022 10:42 AM CDT  Subject: Med refill     Refill norco to [employer]     Have a great 4th of july!

## 2022-06-29 NOTE — TELEPHONE ENCOUNTER
Norco: 6/1/22    Future appt: Your appointments     Date & Time Appointment Department [facility])    Sep 14, 2022 10:30 AM CDT Physical - Established with Rochelle Brown MD 25 [facility])            25 [facility]  [place] 1076 [address]  [phone]        Last Appointment with provider:   4/15/2022  [facility]:  4/15/2022  No results found for: CHOLEST, HDL, LDL, TRIGLY, TRIG  No results found for: EAG, A1C  Lab Results   Component Value Date    T4F 0.9 03/11/2021    TSH 0.953 03/11/2021       No follow-ups on file.

## 2022-07-29 ENCOUNTER — PATIENT MESSAGE (OUTPATIENT)
Dept: FAMILY MEDICINE CLINIC | Facility: CLINIC | Age: 42
End: 2022-07-29

## 2022-07-29 DIAGNOSIS — G89.4 CHRONIC PAIN SYNDROME: ICD-10-CM

## 2022-07-29 RX ORDER — HYDROCODONE BITARTRATE AND ACETAMINOPHEN 5; 325 MG/1; MG/1
TABLET ORAL
Qty: 90 TABLET | Refills: 0 | Status: SHIPPED | OUTPATIENT
Start: 2022-07-29

## 2022-07-29 NOTE — TELEPHONE ENCOUNTER
Last refill - 6/29/22    Future appt: Your appointments     Date & Time Appointment Department [facility])    Sep 14, 2022 10:30 AM CDT Physical - Established with Darinel Tomas MD [address] [place] [address] ([facility])            [address] [place] [address] [facility]  [place] 1076 [address]  [phone]          Last Appointment with provider:   4/15/2022 - pre-op  [facility]:  4/15/2022      No results found for: CHOLEST, HDL, LDL, TRIGLY, TRIG  No results found for: EAG, A1C  Lab Results   Component Value Date    T4F 0.9 03/11/2021    TSH 0.953 03/11/2021       No follow-ups on file.

## 2022-08-26 ENCOUNTER — PATIENT MESSAGE (OUTPATIENT)
Dept: FAMILY MEDICINE CLINIC | Facility: CLINIC | Age: 42
End: 2022-08-26

## 2022-08-26 DIAGNOSIS — G89.4 CHRONIC PAIN SYNDROME: ICD-10-CM

## 2022-08-26 RX ORDER — HYDROCODONE BITARTRATE AND ACETAMINOPHEN 5; 325 MG/1; MG/1
TABLET ORAL
Qty: 90 TABLET | Refills: 0 | Status: SHIPPED | OUTPATIENT
Start: 2022-08-26

## 2022-08-26 RX ORDER — FUROSEMIDE 40 MG/1
40 TABLET ORAL DAILY
Qty: 90 TABLET | Refills: 1 | Status: SHIPPED | OUTPATIENT
Start: 2022-08-26

## 2022-08-26 NOTE — TELEPHONE ENCOUNTER
Norco: 7/29/22  Furosemide: 2/28/22    Future appt: Your appointments     Date & Time Appointment Department [facility])    Sep 14, 2022 10:30 AM CDT Physical - Established with Lucinda Heller MD 25 [facility])            25 [facility] [place]  [place] 1076 [address]  [phone]        Last Appointment with provider:   4/15/2022  Last appointment at [facility]:  4/15/2022  No results found for: CHOLEST, HDL, LDL, TRIGLY, TRIG  No results found for: EAG, A1C  Lab Results   Component Value Date    T4F 0.9 03/11/2021    TSH 0.953 03/11/2021       No follow-ups on file.

## 2022-08-26 NOTE — TELEPHONE ENCOUNTER
From: Humberto Taylor  To:  Misbah House MD  Sent: 8/26/2022 2:52 PM CDT  Subject: Jimmy Leone fax over refill for lasix   Refill norco

## 2022-08-29 RX ORDER — POTASSIUM CHLORIDE 750 MG/1
10 TABLET, FILM COATED, EXTENDED RELEASE ORAL 2 TIMES DAILY
Qty: 180 TABLET | Refills: 1 | Status: SHIPPED | OUTPATIENT
Start: 2022-08-29

## 2022-08-29 NOTE — TELEPHONE ENCOUNTER
Future appt: Your appointments     Date & Time Appointment Department [facility])    Sep 14, 2022 10:30 AM CDT Physical - Established with Brittani Pelayo MD 25 [facility])            25 [facility] 1076 [address]  [phone]        Last Appointment with provider:   4/15/2022  Last appointment at [facility]:  4/15/2022  No results found for: CHOLEST, HDL, LDL, TRIGLY, TRIG  No results found for: EAG, A1C  Lab Results   Component Value Date    T4F 0.9 03/11/2021    TSH 0.953 03/11/2021       No follow-ups on file.

## 2022-09-16 ENCOUNTER — TELEPHONE (OUTPATIENT)
Dept: FAMILY MEDICINE CLINIC | Facility: CLINIC | Age: 42
End: 2022-09-16

## 2022-09-16 NOTE — TELEPHONE ENCOUNTER
Appointment R/S. Kiki Degroot CMA, 09/16/22, 11:32 AM    Future appt: Your appointments     Date & Time Appointment Department [facility])    Nov 15, 2022  9:00 AM CST Physical - Established with Brittani Pelayo MD 25 [facility])            25 [facility] 1076 [address]  [phone]        Last Appointment with provider:   4/15/2022  [facility] appointment [facility]:  4/15/2022  No results found for: CHOLEST, HDL, LDL, TRIGLY, TRIG  No results found for: EAG, A1C  Lab Results   Component Value Date    T4F 0.9 03/11/2021    TSH 0.953 03/11/2021       No follow-ups on file.

## 2022-09-16 NOTE — TELEPHONE ENCOUNTER
Pt cancel appt on 9/14 w/ Dr Gallo Wyatt for a px. Called to r/s appt- no openings with Dr. Gallo Wyatt. Requested a call from the nurse, did not want to schedule with anyone else.

## 2022-09-23 ENCOUNTER — PATIENT MESSAGE (OUTPATIENT)
Dept: FAMILY MEDICINE CLINIC | Facility: CLINIC | Age: 42
End: 2022-09-23

## 2022-09-23 DIAGNOSIS — G89.4 CHRONIC PAIN SYNDROME: ICD-10-CM

## 2022-09-23 RX ORDER — HYDROCODONE BITARTRATE AND ACETAMINOPHEN 5; 325 MG/1; MG/1
TABLET ORAL
Qty: 90 TABLET | Refills: 0 | Status: SHIPPED | OUTPATIENT
Start: 2022-09-23

## 2022-09-23 NOTE — TELEPHONE ENCOUNTER
Norco Refill:  8/26/22    Future appt: Your appointments     Date & Time Appointment Department [facility])    Nov 15, 2022  9:00 AM CST Physical - Established with Eros Rojas MD [facility])            25 [facility]  [place] 1076 [address]  [phone]        Last Appointment with provider:   4/15/2022  Last appointment at [facility]:  4/15/2022  No results found for: CHOLEST, HDL, LDL, TRIGLY, TRIG  No results found for: EAG, A1C  Lab Results   Component Value Date    T4F 0.9 03/11/2021    TSH 0.953 03/11/2021       No follow-ups on file.

## 2022-09-23 NOTE — TELEPHONE ENCOUNTER
From: Humberto Taylor  To:  Misbah House MD  Sent: 9/23/2022 1:53 PM CDT  Subject: Med    Refill norco

## 2022-10-21 ENCOUNTER — PATIENT MESSAGE (OUTPATIENT)
Dept: FAMILY MEDICINE CLINIC | Facility: CLINIC | Age: 42
End: 2022-10-21

## 2022-10-21 DIAGNOSIS — G89.4 CHRONIC PAIN SYNDROME: ICD-10-CM

## 2022-10-21 RX ORDER — HYDROCODONE BITARTRATE AND ACETAMINOPHEN 5; 325 MG/1; MG/1
TABLET ORAL
Qty: 90 TABLET | Refills: 0 | Status: SHIPPED | OUTPATIENT
Start: 2022-10-21

## 2022-10-21 NOTE — TELEPHONE ENCOUNTER
From: Pierre Grande  To:  Demetri Vazquez MD  Sent: 10/21/2022 9:31 AM CDT  Subject: Med    Refill norco to [employer]

## 2022-10-21 NOTE — TELEPHONE ENCOUNTER
Norco: 9/23/22    Future appt: Your appointments     Date & Time Appointment Department [facility])    Nov 15, 2022  9:00 AM CST Physical - Established with Christy House MD 25 [facility])            [address] [place] [address] [facility] 1076 [address]  [phone]        Last Appointment with provider:  4/15/22  [facility] appointment [facility]:  Visit date not found  No results found for: CHOLEST, HDL, LDL, TRIGLY, TRIG  No results found for: EAG, A1C  Lab Results   Component Value Date    T4F 0.9 03/11/2021    TSH 0.953 03/11/2021       No follow-ups on file.

## 2022-11-14 ENCOUNTER — TELEPHONE (OUTPATIENT)
Dept: FAMILY MEDICINE CLINIC | Facility: CLINIC | Age: 42
End: 2022-11-14

## 2022-11-14 NOTE — TELEPHONE ENCOUNTER
TS-Yes testing, cough and runny nose    Pt states she has a cold so she currently has a cough and runny nose. She did a test for Covid which was negative. Your appointments     Date & Time Appointment Department [facility])    Nov 15, 2022  9:00 AM CST Physical - Established with Dorota Melton MD 1924 [facility] ([facility])            [facility]  [facility] 1076 [address]  [phone]        Appt ok for tomorrow?

## 2022-11-15 ENCOUNTER — OFFICE VISIT (OUTPATIENT)
Dept: FAMILY MEDICINE CLINIC | Facility: CLINIC | Age: 42
End: 2022-11-15
Payer: MEDICAID

## 2022-11-15 VITALS
HEIGHT: 62 IN | DIASTOLIC BLOOD PRESSURE: 70 MMHG | TEMPERATURE: 98 F | BODY MASS INDEX: 23.7 KG/M2 | HEART RATE: 96 BPM | RESPIRATION RATE: 16 BRPM | WEIGHT: 128.81 LBS | SYSTOLIC BLOOD PRESSURE: 112 MMHG | OXYGEN SATURATION: 97 %

## 2022-11-15 DIAGNOSIS — Z90.710 STATUS POST HYSTERECTOMY: ICD-10-CM

## 2022-11-15 DIAGNOSIS — R60.1 GENERALIZED EDEMA: ICD-10-CM

## 2022-11-15 DIAGNOSIS — N64.4 MASTODYNIA: ICD-10-CM

## 2022-11-15 DIAGNOSIS — Z90.13 S/P BILATERAL MASTECTOMY: ICD-10-CM

## 2022-11-15 DIAGNOSIS — F32.0 CURRENT MILD EPISODE OF MAJOR DEPRESSIVE DISORDER WITHOUT PRIOR EPISODE (HCC): ICD-10-CM

## 2022-11-15 DIAGNOSIS — M12.9 ARTHROPATHY: ICD-10-CM

## 2022-11-15 DIAGNOSIS — Z15.02 GENETIC SUSCEPTIBILITY TO OVARIAN CANCER: ICD-10-CM

## 2022-11-15 DIAGNOSIS — R87.811 VAGINAL HIGH RISK HUMAN PAPILLOMAVIRUS (HPV) DNA TEST POSITIVE: ICD-10-CM

## 2022-11-15 DIAGNOSIS — M54.16 LUMBAR RADICULOPATHY: ICD-10-CM

## 2022-11-15 DIAGNOSIS — N95.1 MENOPAUSAL SYNDROME: ICD-10-CM

## 2022-11-15 DIAGNOSIS — D68.01 TYPE 1 VON WILLEBRAND DISEASE: ICD-10-CM

## 2022-11-15 DIAGNOSIS — L23.1 ALLERGIC CONTACT DERMATITIS DUE TO ADHESIVES: ICD-10-CM

## 2022-11-15 DIAGNOSIS — Z98.890 S/P BREAST RECONSTRUCTION, BILATERAL: ICD-10-CM

## 2022-11-15 DIAGNOSIS — G89.4 CHRONIC PAIN SYNDROME: ICD-10-CM

## 2022-11-15 DIAGNOSIS — Z15.01 GENETIC SUSCEPTIBILITY TO BREAST CANCER: ICD-10-CM

## 2022-11-15 DIAGNOSIS — R91.1 PULMONARY NODULE: ICD-10-CM

## 2022-11-15 DIAGNOSIS — Z85.3 HISTORY OF BREAST CANCER: ICD-10-CM

## 2022-11-15 DIAGNOSIS — M51.9 LUMBAR DISC DISEASE: ICD-10-CM

## 2022-11-15 DIAGNOSIS — Z00.00 HEALTHCARE MAINTENANCE: Primary | ICD-10-CM

## 2022-11-15 DIAGNOSIS — N97.9 FEMALE INFERTILITY: ICD-10-CM

## 2022-11-15 PROBLEM — E87.6 HYPOKALEMIA: Status: RESOLVED | Noted: 2017-03-08 | Resolved: 2022-11-15

## 2022-11-15 PROBLEM — Q83.8: Status: RESOLVED | Noted: 2018-01-03 | Resolved: 2022-11-15

## 2022-11-15 PROBLEM — Z01.818 PREOP EXAMINATION: Status: RESOLVED | Noted: 2022-04-15 | Resolved: 2022-11-15

## 2022-11-15 PROBLEM — G44.209 ACUTE NON INTRACTABLE TENSION-TYPE HEADACHE: Status: RESOLVED | Noted: 2019-02-04 | Resolved: 2022-11-15

## 2022-11-15 PROBLEM — R07.81 RIB PAIN ON LEFT SIDE: Status: RESOLVED | Noted: 2022-02-22 | Resolved: 2022-11-15

## 2022-11-15 PROBLEM — Z98.818 S/P WISDOM TOOTH EXTRACTION: Status: RESOLVED | Noted: 2017-02-08 | Resolved: 2022-11-15

## 2022-11-15 PROCEDURE — 3074F SYST BP LT 130 MM HG: CPT | Performed by: FAMILY MEDICINE

## 2022-11-15 PROCEDURE — 90471 IMMUNIZATION ADMIN: CPT | Performed by: FAMILY MEDICINE

## 2022-11-15 PROCEDURE — 3008F BODY MASS INDEX DOCD: CPT | Performed by: FAMILY MEDICINE

## 2022-11-15 PROCEDURE — 90686 IIV4 VACC NO PRSV 0.5 ML IM: CPT | Performed by: FAMILY MEDICINE

## 2022-11-15 PROCEDURE — 3078F DIAST BP <80 MM HG: CPT | Performed by: FAMILY MEDICINE

## 2022-11-15 PROCEDURE — 99213 OFFICE O/P EST LOW 20 MIN: CPT | Performed by: FAMILY MEDICINE

## 2022-11-15 PROCEDURE — 99395 PREV VISIT EST AGE 18-39: CPT | Performed by: FAMILY MEDICINE

## 2022-11-15 RX ORDER — ALBUTEROL SULFATE 90 UG/1
2 AEROSOL, METERED RESPIRATORY (INHALATION) EVERY 4 HOURS PRN
Qty: 8.5 G | Refills: 3 | Status: SHIPPED | OUTPATIENT
Start: 2022-11-15

## 2022-11-18 ENCOUNTER — PATIENT MESSAGE (OUTPATIENT)
Dept: FAMILY MEDICINE CLINIC | Facility: CLINIC | Age: 42
End: 2022-11-18

## 2022-11-18 DIAGNOSIS — G89.4 CHRONIC PAIN SYNDROME: ICD-10-CM

## 2022-11-18 RX ORDER — HYDROCODONE BITARTRATE AND ACETAMINOPHEN 5; 325 MG/1; MG/1
TABLET ORAL
Qty: 90 TABLET | Refills: 0 | Status: SHIPPED | OUTPATIENT
Start: 2022-11-18

## 2022-11-18 RX ORDER — BACLOFEN 10 MG/1
TABLET ORAL
Qty: 30 TABLET | Refills: 3 | Status: SHIPPED | OUTPATIENT
Start: 2022-11-18

## 2022-11-18 NOTE — TELEPHONE ENCOUNTER
BACLOFEN 10 MG Oral Tab     #30  R-3       Summary: TAKE 1 TABLET(10 MG) BY MOUTH THREE TIMES DAILY AS NEEDED        Last Refill- 6/1/22  Last PX- 11/15/22  Recheck in 6 months, follow up appt below    Future appt: Your appointments     Date & Time Appointment Department [facility])    May 23, 2023  3:00 PM CDT Follow up - Extended with Caroline August MD [address] [place] [address])            25 [facility] [place]  [facility] 1076 [phone]  [phone]        Last Appointment with provider:   11/15/2022  Last appointment at [facility]:  11/15/2022  No results found for: CHOLEST, HDL, LDL, TRIGLY, TRIG  No results found for: EAG, A1C  Lab Results   Component Value Date    T4F 0.9 03/11/2021    TSH 0.953 03/11/2021       No follow-ups on file.

## 2022-11-23 ENCOUNTER — PATIENT MESSAGE (OUTPATIENT)
Dept: FAMILY MEDICINE CLINIC | Facility: CLINIC | Age: 42
End: 2022-11-23

## 2022-11-23 DIAGNOSIS — M54.16 LUMBAR RADICULOPATHY: Primary | ICD-10-CM

## 2022-11-23 DIAGNOSIS — G89.4 CHRONIC PAIN SYNDROME: ICD-10-CM

## 2022-11-23 NOTE — TELEPHONE ENCOUNTER
From: Evaristo Leon  To: Marian Reyes MD  Sent: 11/18/2022 1:55 PM CST  Subject: Med/order     Did you do an order for physical therapy?  I know we talked about it   Also I have couple of the cough pearls I still have cough refill on them and norco to [facility]

## 2022-12-20 ENCOUNTER — PATIENT MESSAGE (OUTPATIENT)
Dept: FAMILY MEDICINE CLINIC | Facility: CLINIC | Age: 42
End: 2022-12-20

## 2022-12-20 DIAGNOSIS — G89.4 CHRONIC PAIN SYNDROME: ICD-10-CM

## 2022-12-20 RX ORDER — HYDROCODONE BITARTRATE AND ACETAMINOPHEN 5; 325 MG/1; MG/1
TABLET ORAL
Qty: 90 TABLET | Refills: 0 | Status: SHIPPED | OUTPATIENT
Start: 2022-12-20

## 2022-12-20 NOTE — TELEPHONE ENCOUNTER
From: Shabnam Montez  To:  Arun Castro MD  Sent: 12/20/2022 2:01 PM CST  Subject: Refill     Med refill norco [employer]
Norco: 11/18/22    Future appt: Your appointments     Date & Time Appointment Department [facility])    May 23, 2023  3:00 PM CDT Follow up - Extended with Hanna Braga MD 25 [facility] ([facility])            [address] [place] [address]  [facility] 1076 [phone]  [phone]        Last Appointment with provider:   11/15/2022  [facility] [place]:  11/15/2022  No results found for: CHOLEST, HDL, LDL, TRIGLY, TRIG  No results found for: EAG, A1C  Lab Results   Component Value Date    T4F 0.9 03/11/2021    TSH 0.953 03/11/2021       No follow-ups on file.
Psych/Behavioral

## 2023-01-18 ENCOUNTER — PATIENT MESSAGE (OUTPATIENT)
Dept: FAMILY MEDICINE CLINIC | Facility: CLINIC | Age: 43
End: 2023-01-18

## 2023-01-18 DIAGNOSIS — G89.4 CHRONIC PAIN SYNDROME: ICD-10-CM

## 2023-01-20 RX ORDER — HYDROCODONE BITARTRATE AND ACETAMINOPHEN 5; 325 MG/1; MG/1
TABLET ORAL
Qty: 90 TABLET | Refills: 0 | Status: SHIPPED | OUTPATIENT
Start: 2023-01-20

## 2023-02-17 ENCOUNTER — PATIENT MESSAGE (OUTPATIENT)
Dept: FAMILY MEDICINE CLINIC | Facility: CLINIC | Age: 43
End: 2023-02-17

## 2023-02-17 DIAGNOSIS — G89.4 CHRONIC PAIN SYNDROME: ICD-10-CM

## 2023-02-17 RX ORDER — HYDROCODONE BITARTRATE AND ACETAMINOPHEN 5; 325 MG/1; MG/1
TABLET ORAL
Qty: 90 TABLET | Refills: 0 | Status: SHIPPED | OUTPATIENT
Start: 2023-02-17

## 2023-02-17 RX ORDER — IBUPROFEN 800 MG/1
800 TABLET ORAL EVERY 8 HOURS PRN
Qty: 30 TABLET | Refills: 1 | Status: SHIPPED | OUTPATIENT
Start: 2023-02-17

## 2023-02-17 NOTE — TELEPHONE ENCOUNTER
Future appt: Your appointments     Date & Time Appointment Department [facility])    May 23, 2023  3:00 PM CDT Follow up - Extended with Hanna Braga MD [address] [place] [address] ([facility])            [address] [place] [address] [facility]  [facility] 1076 [phone]  [phone]        Last Appointment with provider:   11/15/2022 for annual physical.  [facility]:  11/15/2022  No results found for: Salvador Rumsey, HDL, LDL, TRIGLY, TRIG  No results found for: EAG, A1C  Lab Results   Component Value Date    T4F 0.9 03/11/2021    TSH 0.953 03/11/2021       No follow-ups on file.

## 2023-02-17 NOTE — TELEPHONE ENCOUNTER
From: Evaristo Leon  To:  Marian Reyes MD  Sent: 2/17/2023 3:35 PM CST  Subject: Med     [facility] norco   Ibuprofen

## 2023-03-17 ENCOUNTER — PATIENT MESSAGE (OUTPATIENT)
Dept: FAMILY MEDICINE CLINIC | Facility: CLINIC | Age: 43
End: 2023-03-17

## 2023-03-17 DIAGNOSIS — G89.4 CHRONIC PAIN SYNDROME: ICD-10-CM

## 2023-03-17 RX ORDER — HYDROCODONE BITARTRATE AND ACETAMINOPHEN 5; 325 MG/1; MG/1
TABLET ORAL
Qty: 90 TABLET | Refills: 0 | Status: SHIPPED | OUTPATIENT
Start: 2023-03-17

## 2023-05-03 ENCOUNTER — PATIENT MESSAGE (OUTPATIENT)
Dept: FAMILY MEDICINE CLINIC | Facility: CLINIC | Age: 43
End: 2023-05-03

## 2023-05-03 DIAGNOSIS — G89.4 CHRONIC PAIN SYNDROME: ICD-10-CM

## 2023-05-03 RX ORDER — HYDROCODONE BITARTRATE AND ACETAMINOPHEN 5; 325 MG/1; MG/1
TABLET ORAL
Qty: 90 TABLET | Refills: 0 | Status: SHIPPED | OUTPATIENT
Start: 2023-05-03

## 2023-05-03 NOTE — TELEPHONE ENCOUNTER
From: Gris Contreras  To:  Maryjane Anderson MD  Sent: 5/3/2023 8:28 AM CDT  Subject: Med refilled     [place] [facility] to [facility] in [place]

## 2023-05-09 ENCOUNTER — TELEPHONE (OUTPATIENT)
Dept: FAMILY MEDICINE CLINIC | Facility: CLINIC | Age: 43
End: 2023-05-09

## 2023-05-09 NOTE — TELEPHONE ENCOUNTER
I called [employer] and confirmed that we did receive the medical records request and it has been forwarded to [employer] today.

## 2023-05-09 NOTE — TELEPHONE ENCOUNTER
Received request to send last 2 years of medical records to IZAIAH Brown [address] [place] [address].  Sent to [employer]

## 2023-05-23 ENCOUNTER — TELEPHONE (OUTPATIENT)
Dept: FAMILY MEDICINE CLINIC | Facility: CLINIC | Age: 43
End: 2023-05-23

## 2023-05-23 NOTE — TELEPHONE ENCOUNTER
Patient no showed for an appointment today with Dr. Ray Self.  Left message for patient to call back to reschedule

## 2023-05-26 NOTE — TELEPHONE ENCOUNTER
1st no show letter sent to patient via [id number] due to 5/23/2023 missed appointment with Dr Jennyfer Kaba.

## 2023-06-07 ENCOUNTER — PATIENT MESSAGE (OUTPATIENT)
Dept: FAMILY MEDICINE CLINIC | Facility: CLINIC | Age: 43
End: 2023-06-07

## 2023-06-07 DIAGNOSIS — G89.4 CHRONIC PAIN SYNDROME: ICD-10-CM

## 2023-06-07 RX ORDER — HYDROCODONE BITARTRATE AND ACETAMINOPHEN 5; 325 MG/1; MG/1
TABLET ORAL
Qty: 90 TABLET | Refills: 0 | Status: SHIPPED | OUTPATIENT
Start: 2023-06-07

## 2023-06-07 NOTE — TELEPHONE ENCOUNTER
Future appt: Your appointments     Date & Time Appointment Department [facility])    Jun 28, 2023  4:00 PM CDT Follow up - Extended with MD Nathaniel Singh [place] ([facility])            [facility] 1076 [phone]  [phone]        Last Appointment with provider:   5/23/2023  Last appointment at [facility]:  5/23/2023  No results found for: CHOLEST, HDL, LDL, TRIGLY, TRIG  No results found for: EAG, A1C  Lab Results   Component Value Date    T4F 0.9 03/11/2021    TSH 0.953 03/11/2021     Last RF:  5/3/2023    No follow-ups on file.

## 2023-06-07 NOTE — TELEPHONE ENCOUNTER
From: Jimi Sparks  To:  Felisha Ordonez MD  Sent: 6/7/2023 11:30 AM CDT  Subject: Med refill    Refill norco to [employer]

## 2023-06-28 ENCOUNTER — OFFICE VISIT (OUTPATIENT)
Dept: FAMILY MEDICINE CLINIC | Facility: CLINIC | Age: 43
End: 2023-06-28
Payer: MEDICAID

## 2023-06-28 VITALS
OXYGEN SATURATION: 98 % | TEMPERATURE: 98 F | SYSTOLIC BLOOD PRESSURE: 114 MMHG | BODY MASS INDEX: 23.4 KG/M2 | RESPIRATION RATE: 18 BRPM | HEIGHT: 62 IN | HEART RATE: 106 BPM | DIASTOLIC BLOOD PRESSURE: 68 MMHG | WEIGHT: 127.19 LBS

## 2023-06-28 DIAGNOSIS — K21.9 GASTROESOPHAGEAL REFLUX DISEASE WITHOUT ESOPHAGITIS: ICD-10-CM

## 2023-06-28 DIAGNOSIS — G89.4 CHRONIC PAIN SYNDROME: Primary | ICD-10-CM

## 2023-06-28 DIAGNOSIS — R60.1 GENERALIZED EDEMA: ICD-10-CM

## 2023-06-28 DIAGNOSIS — M54.16 LUMBAR RADICULOPATHY: ICD-10-CM

## 2023-06-28 PROBLEM — F32.0 CURRENT MILD EPISODE OF MAJOR DEPRESSIVE DISORDER WITHOUT PRIOR EPISODE (HCC): Status: RESOLVED | Noted: 2020-02-14 | Resolved: 2023-06-28

## 2023-06-28 PROCEDURE — 99213 OFFICE O/P EST LOW 20 MIN: CPT | Performed by: FAMILY MEDICINE

## 2023-06-28 PROCEDURE — 3078F DIAST BP <80 MM HG: CPT | Performed by: FAMILY MEDICINE

## 2023-06-28 PROCEDURE — 3074F SYST BP LT 130 MM HG: CPT | Performed by: FAMILY MEDICINE

## 2023-06-28 PROCEDURE — 3008F BODY MASS INDEX DOCD: CPT | Performed by: FAMILY MEDICINE

## 2023-06-28 RX ORDER — FLUOCINOLONE ACETONIDE 0.25 MG/G
OINTMENT TOPICAL
Qty: 60 G | Refills: 0 | Status: SHIPPED | OUTPATIENT
Start: 2023-06-28

## 2023-07-05 NOTE — TELEPHONE ENCOUNTER
Pt insurance is only allowing to get a 7 day supply at a time. I spoke to evonne and pt picked up 21 pills on the 4th of December. Pt is currently out.      Request for another 7 day supply at [facility]

## 2023-07-11 ENCOUNTER — PATIENT MESSAGE (OUTPATIENT)
Dept: FAMILY MEDICINE CLINIC | Facility: CLINIC | Age: 43
End: 2023-07-11

## 2023-07-11 DIAGNOSIS — G89.4 CHRONIC PAIN SYNDROME: ICD-10-CM

## 2023-07-11 RX ORDER — HYDROCODONE BITARTRATE AND ACETAMINOPHEN 5; 325 MG/1; MG/1
TABLET ORAL
Qty: 90 TABLET | Refills: 0 | Status: SHIPPED | OUTPATIENT
Start: 2023-07-11

## 2023-07-11 RX ORDER — FUROSEMIDE 40 MG/1
40 TABLET ORAL DAILY
Qty: 90 TABLET | Refills: 1 | Status: SHIPPED | OUTPATIENT
Start: 2023-07-11

## 2023-07-11 NOTE — TELEPHONE ENCOUNTER
Future appt: Your appointments       Date & Time Appointment Department [facility])    Dec 27, 2023  3:30 PM CST Follow up - Extended with Ashley Alarcon MD [address] [facility] [place] ([place])              [address] [facility]  [phone]          Last Appointment with provider:   6/28/2023 Follow  up visit with Dr. Pete Sanchez  [facility]:  6/28/2023  No results found for: Tomas Roof, HDL, LDL, TRIGLY, TRIG  No results found for: EAG, A1C  Lab Results   Component Value Date    T4F 0.9 03/11/2021    TSH 0.953 03/11/2021       No follow-ups on file.

## 2023-07-11 NOTE — TELEPHONE ENCOUNTER
From: Gris Contreras  To: Paul Barrett MD  Sent: 7/11/2023 9:09 AM CDT  Subject: Med refill    Need furosemide and norco to [employer]

## 2023-08-11 ENCOUNTER — PATIENT MESSAGE (OUTPATIENT)
Dept: FAMILY MEDICINE CLINIC | Facility: CLINIC | Age: 43
End: 2023-08-11

## 2023-08-11 DIAGNOSIS — G89.4 CHRONIC PAIN SYNDROME: ICD-10-CM

## 2023-08-11 RX ORDER — HYDROCODONE BITARTRATE AND ACETAMINOPHEN 5; 325 MG/1; MG/1
TABLET ORAL
Qty: 90 TABLET | Refills: 0 | Status: SHIPPED | OUTPATIENT
Start: 2023-08-11

## 2023-08-11 NOTE — TELEPHONE ENCOUNTER
Norco: 7/11/23    Future appt: Your appointments       Date & Time Appointment Department [facility])    Dec 27, 2023  3:30 PM CST Follow up - Extended with César Miller MD [address] [facility], [place] ([facility])              [address] [facility], [facility]  [phone]          Last Appointment with provider:   6/28/2023  Last appointment at [facility]:  6/28/2023  No results found for: CHOLEST, HDL, LDL, TRIGLY, TRIG  No results found for: EAG, A1C  Lab Results   Component Value Date    T4F 0.9 03/11/2021    TSH 0.953 03/11/2021       No follow-ups on file.

## 2023-08-11 NOTE — TELEPHONE ENCOUNTER
From: Shabnam Montez  To: Johnny Richard MD  Sent: 8/11/2023 12:20 PM CDT  Subject: Med refill     [place] to [employer]

## 2023-08-29 RX ORDER — POTASSIUM CHLORIDE 750 MG/1
10 TABLET, FILM COATED, EXTENDED RELEASE ORAL 2 TIMES DAILY
Qty: 180 TABLET | Refills: 1 | Status: SHIPPED | OUTPATIENT
Start: 2023-08-29

## 2023-09-04 NOTE — TELEPHONE ENCOUNTER
Norco:  4/6/22    Future appt: Your appointments     Date & Time Appointment Department [facility])    Sep 14, 2022 10:30 AM CDT Physical - Established with Darinel Tomas MD [address] [place] [address])            [address] [place], [place] [address] [id number]  [phone]        Last Appointment with provider:   4/15/2022  Last appointment at [facility]:  4/15/2022  No results found for: CHOLEST, HDL, LDL, TRIGLY, TRIG  No results found for: EAG, A1C  Lab Results   Component Value Date    T4F 0.9 03/11/2021    TSH 0.953 03/11/2021       No follow-ups on file. Discarded in the usual manner

## 2023-09-08 ENCOUNTER — PATIENT MESSAGE (OUTPATIENT)
Dept: FAMILY MEDICINE CLINIC | Facility: CLINIC | Age: 43
End: 2023-09-08

## 2023-09-08 DIAGNOSIS — G89.4 CHRONIC PAIN SYNDROME: ICD-10-CM

## 2023-09-08 RX ORDER — HYDROCODONE BITARTRATE AND ACETAMINOPHEN 5; 325 MG/1; MG/1
TABLET ORAL
Qty: 90 TABLET | Refills: 0 | Status: SHIPPED | OUTPATIENT
Start: 2023-09-08

## 2023-09-08 NOTE — TELEPHONE ENCOUNTER
Last appt 6/28/23 advised Return in about 6 months (around 12/28/2023) for physical.      Future Appointments   Date Time Provider Jerrod Gallardoi   12/27/2023  3:30 PM Giorgio Lennon MD [facility]

## 2023-09-08 NOTE — TELEPHONE ENCOUNTER
From: Lalitha Starr  To: Nicolás Reynoso MD  Sent: 9/8/2023 3:17 PM CDT  Subject: Med fill    [place] to [employer]

## 2024-09-12 NOTE — PATIENT INSTRUCTIONS
Ibuprofen 800 mg three times a day with food     Medrol dose pack -     physical therapy     Use Norco sparingly Social Work Assessment  Questions/Answers      Flowsheet Row Most Recent Value   Referral Source physician   Reason for Consult community resources   Preferred Language English   People in Home alone   Current Living Arrangements apartment   Primary Care Provided by self, child(augustine)   Provides Primary Care For no one, unable/limited ability to care for self   Family Caregiver if Needed child(augustine), adult   Quality of Family Relationships helpful   Source of Income social security   Application for Public Assistance obtained public assistance pending number          SDOH updated and reviewed with the patient during this program:  --     Disabilities: Not At Risk (2/6/2024)    Disabilities     Concentrating, Remembering, or Making Decisions Difficulty: no     Doing Errands Independently Difficulty: no      --     Employment: Not At Risk (2/6/2024)    Employment     Do you want help finding or keeping work or a job?: I do not need or want help      Financial Resource Strain: Low Risk  (9/10/2024)    Overall Financial Resource Strain (CARDIA)     Difficulty of Paying Living Expenses: Not hard at all      --     Food Insecurity: No Food Insecurity (9/10/2024)    Hunger Vital Sign     Worried About Running Out of Food in the Last Year: Never true     Ran Out of Food in the Last Year: Never true      --     Housing Stability: Not At Risk (9/12/2024)    Housing Stability     Current Living Arrangements: apartment     Potentially Unsafe Housing Conditions: none      --     Transportation Needs: No Transportation Needs (9/10/2024)    PRAPARE - Transportation     Lack of Transportation (Medical): No     Lack of Transportation (Non-Medical): No      --     Utilities: Not At Risk (2/6/2024)    [facility] Utilities     Threatened with loss of utilities: No     Patient Outreach    MSW spoke with patient's daughter to discuss resources needed. Patient's daughter interested in services patient was receiving in [place]. MSW educated on [place]  Medicaid waiver services and how to apply. MSW also provided transportation supports. MSW sent all information to daughter's email for review and to reach out to MSW or LTADD for any further assistance.    Celeste RANKIN -   Ambulatory Case Management    9/12/2024, 11:09 EDT

## 2024-10-20 NOTE — TELEPHONE ENCOUNTER
From: Risa Cohen  To:  Haleigh Quinones MD  Sent: 1/18/2023 4:37 PM CST  Subject: Med     [facility] norco
Norco: 12/20/22    Future appt: Your appointments     Date & Time Appointment Department [facility])    May 23, 2023  3:00 PM CDT Follow up - Extended with Rakesh Toure MD [address], [place] Humphrey ([facility])            [address], Moustapha Izaguirre  [facility] [phone]  [phone]        Last Appointment with provider:   11/15/2022  Last appointment at [facility]:  11/15/2022  No results found for: CHOLEST, HDL, LDL, TRIGLY, TRIG  No results found for: EAG, A1C  Lab Results   Component Value Date    T4F 0.9 03/11/2021    TSH 0.953 03/11/2021       No follow-ups on file.
4/day(s)

## 2025-06-06 NOTE — TELEPHONE ENCOUNTER
From: Anoop Castellano  To:  Staci Donald MD  Sent: 3/17/2023 2:26 PM CDT  Subject: Refill     Refill norco
Future appt: Your appointments     Date & Time Appointment Department [facility])    May 23, 2023  3:00 PM CDT Follow up - Extended with Jil Patiño MD [address], Chris Amaya ([facility])            [address], [facility] [address] [phone]  [phone]        Last Appointment with provider:   11/15/2022  Last appointment at [facility]:  11/15/2022  No results found for: CHOLEST, HDL, LDL, TRIGLY, TRIG  No results found for: EAG, A1C  Lab Results   Component Value Date    T4F 0.9 03/11/2021    TSH 0.953 03/11/2021       No follow-ups on file.
1

## 2025-06-09 NOTE — ADDENDUM NOTE
Addended by: He Comes on: 2/11/2019 01:40 PM     Modules accepted: Orders Hide Include Location In Plan Question?: No Detail Level: Zone

## 2025-07-25 NOTE — TELEPHONE ENCOUNTER
[facility]     David Nur MD, FACP, MACG, FAASLD   MD Elza Campoverde PA-C April S Ashworth, [facility]   Luz Alvesayad, [facility]  Tyrone Hugo, P-C   Britneydevonte Lagunas, [facility]   [address], [address]   [place]  [address] [phone]   FAX: [phone]  [facility]   [address], [address]   [place]  [address] [phone]   FAX: [phone]         ENDOSCOPY DISCHARGE INSTRUCTIONS      DISCOMFORT:  Use lozenges or warm salt water gargle for sore thoat  Apply warm compress to IV site if red.  If redness or soreness persists call the office.  You may experience gas and bloating.  Walking and belching will help relieve this.  You may experience chest discomfort or pressure especially if banding of esophageal varices was performed.    DIET:  You may resume your normal diet.    ACTIVITY:  Spend the remainder of the day resting.  Avoid any strenuous activity.  You may not operate a vehicle for 12 hours.  You may not engage in an occupation involving machinery or appliances for rest of today.   Avoid making any critical or financial decisions for at least 24 hour.    Call the [facility] Office if you have any of the following:  Increasing chest or abdominal pain, nausea, vomiting, vomiting blood, abdominal distension or swelling, fever or chills, bloody discharge from nose or mouth or shortness of breath.    Follow-up Instructions:  Call Dr. Kennedy for any questions or problems at the phone number listed above.  If a biopsy was performed, you will be contacted by the office staff or Dr Kennedy within 1 week.   If you have not heard from us by then you may call the office at the phone number listed above to inquire about the  Patient states She is down to Norco 2 Tabs Daily. States She saw Surgeon on Tuesday and has been released back to PCP and is  Not to be seen by Surgeon for 3 Months. States did not get a new [place] Rx when seen on Tuesday.   Surgeon is not in the [employer]

## (undated) DIAGNOSIS — G89.4 CHRONIC PAIN SYNDROME: ICD-10-CM

## (undated) NOTE — MR AVS SNAPSHOT
[address]  [place] [address] [phone]  [phone]               Thank you for choosing us for your health care visit with Erasmo Juarez MD.  We are glad to serve you and happy to provide you with this summary o Ferrous Sulfate 325 (65 Fe) MG Tabs   Take 1 tablet by mouth daily. furosemide 20 MG Tabs   Take 1 tablet by mouth daily. Commonly known as:  LASIX           ibuprofen 600 MG Tabs   Take 1 tablet by mouth As Directed.  1 tabs Q6hrs/prn   Common Visit [facility] online at  [email or web address]

## (undated) NOTE — MR AVS SNAPSHOT
[address]  [place] [phone]               Thank you for choosing us for your health care visit with Candace Figueroa MD.  We are glad to serve you and happy to provide you with this summary o This list is accurate as of: 6/5/17  2:32 PM.  Always use your most recent med list.                Biotin 41617 MCG Tbdp   Take 1 tablet by mouth daily. estradiol 0.5 MG Tabs   Take 0.5 mg by mouth daily.    Commonly known as:  ESTRACE your doctor or other care provider to review them with you.          Where to Get Your Medications      These medications were sent to [address], [address] [facility]  [address] [place] [address] [address] [facility], [phone], [phone]

## (undated) NOTE — MR AVS SNAPSHOT
[address]  [place] [address] [phone]  [phone]               Thank you for choosing us for your health care visit with Diana Holloway MD.  We are glad to serve you and happy to provide you with this summary o What changed:  Another medication with the same name was added. Make sure you understand how and when to take each. Commonly known as:  NORCO           * hydrocodone-acetaminophen 7.5-325 MG Tabs   Take 1 tablet by mouth daily as needed for Pain.    What Enter your Well Done Activation Code exactly as it appears below along with your Zip Code and Date of Birth to complete the sign-up process. If you do not sign up before the expiration date, you must request a new code.     Your unique Well Done Access Code: [email or web address]

## (undated) NOTE — LETTER
10/04/19        Nica Anna  [address]      Dear Darshan Wen,    Our records indicate that you have outstanding lab work and or testing that was ordered for you and has not yet been completed:  Orders Placed This Encounter

## (undated) NOTE — MR AVS SNAPSHOT
[address]  [address] [address] [phone]  [phone]               Thank you for choosing us for your health care visit with Yenny Almeida MD.  We are glad to serve you and happy to provide you with this summary o Other reaction(s): foggy thinking, poor memory    Latex                 Today's Vital Signs     BP Pulse Temp Height Weight BMI    120/84 mmHg 112 98.6 °F (37 °C) (Temporal) 61.5\" 146 lb 6 oz 27.21 kg/m2    Breastfeeding?                    No information, go to [email or web address] and click on the Sign Up Now link in the [employer] box. Enter your Emotive Activation Code exactly as it appears below along with your Zip Code and Date of Birth to complete the sign-up process.  If you do You don’t need to join a gym. Home exercises work great.  Put more priority on exercise in your life                    Visit [facility] online at  [email or web address]